# Patient Record
Sex: MALE | Race: BLACK OR AFRICAN AMERICAN | NOT HISPANIC OR LATINO | Employment: FULL TIME | URBAN - METROPOLITAN AREA
[De-identification: names, ages, dates, MRNs, and addresses within clinical notes are randomized per-mention and may not be internally consistent; named-entity substitution may affect disease eponyms.]

---

## 2021-07-27 ENCOUNTER — HOSPITAL ENCOUNTER (EMERGENCY)
Facility: CLINIC | Age: 46
Discharge: HOME | End: 2021-07-27
Attending: EMERGENCY MEDICINE | Admitting: EMERGENCY MEDICINE
Payer: COMMERCIAL

## 2021-07-27 VITALS
HEIGHT: 71 IN | TEMPERATURE: 98.3 F | RESPIRATION RATE: 18 BRPM | OXYGEN SATURATION: 100 % | SYSTOLIC BLOOD PRESSURE: 131 MMHG | HEART RATE: 86 BPM | DIASTOLIC BLOOD PRESSURE: 87 MMHG | BODY MASS INDEX: 32.62 KG/M2 | WEIGHT: 233 LBS

## 2021-07-27 DIAGNOSIS — K61.2 ABSCESS OF ANAL AND RECTAL REGIONS: ICD-10-CM

## 2021-07-27 DIAGNOSIS — R22.9 LOCALIZED SUPERFICIAL SWELLING, MASS, OR LUMP: ICD-10-CM

## 2021-07-27 PROCEDURE — 46050 I&D PERIANAL ABSCESS SUPFC: CPT

## 2021-07-27 PROCEDURE — 99283 EMERGENCY DEPT VISIT LOW MDM: CPT | Mod: 25

## 2021-07-27 RX ORDER — LIDOCAINE HYDROCHLORIDE 10 MG/ML
INJECTION, SOLUTION INFILTRATION; PERINEURAL
Status: DISCONTINUED
Start: 2021-07-27 | End: 2021-07-27 | Stop reason: HOSPADM

## 2021-07-27 ASSESSMENT — MIFFLIN-ST. JEOR: SCORE: 1954.01

## 2021-07-27 NOTE — ED TRIAGE NOTES
Pt presents for evaluation of a boil to the perineum (between the testicles and rectum). Pain present for last week. No drainage. Feels lump and is visible when looking in the mirror. Nothing taken for pain. No hx of similar.

## 2021-07-28 NOTE — ED PROVIDER NOTES
"  History   Chief Complaint:  Boil    The history is provided by the patient.      Tayo Ramos is a 46 year old male with history of asthma, hyperlipidemia, and thyroid nodules who presents with a boil near his scrotum. The patient explains that this boil has remained consistently present for the last 7 days. There is no fluid coming out of the site, but the boil has become harder. The boil and surrounding area is tender; when he washes the area and rubs across the boil, it burns. He had something similar about 2 years ago and it was cut out and bandaged appropriately.     Review of Systems   Skin:        (+) boil   All other systems reviewed and are negative.    Allergies:  Eggs  Influenza Vaccines    Medications:  Albuterol inhaler  Advair Diskus inhaler   Ultram    Past Medical History:    Asthma  Hyperlipidemia  Multiple thyroid nodules  Groin mass  Obstructive azoospermia    Social History:  Presents alone  History of smoking  Occasional alcohol use    Physical Exam     Patient Vitals for the past 24 hrs:   BP Temp Temp src Pulse Resp SpO2 Height Weight   07/27/21 1651 136/81 98.3  F (36.8  C) Temporal 99 18 99 % 1.803 m (5' 11\") 105.7 kg (233 lb)       Physical Exam  Vitals: reviewed by me  General: Pt seen on [facility], cooperative, and alert to conversation  Eyes: Tracking well, clear conjunctiva BL  ENT: MMM, midline trachea.   Lungs: No tachypnea, no accessory muscle use. No respiratory distress.   CV: Rate as above  Abd: Soft, non tender, no guarding, no rebound. Non distended  Does have a nickel sized lesion to his perineum, it is soft, mobile, tender.  Not red or inflamed.  No crepitance, no surrounding tenderness to palpation.  MSK: no joint effusion.  No evidence of trauma  Skin: No rash  Neuro: Clear speech and no facial droop.  Psych: Not RIS, no e/o AH/VH      Emergency Department Course     Procedures    Incision and Drainage     LOCATIONS:  Perineum     ANESTHESIA:  Local field " block using Lidocaine 1% without epinephrine, total of 1 mLs     PREPARATION:  Cleansed with Betadine     PROCEDURE:  Area was incised with # 11 Blade ([place]) with x-shaped incision.  Wound treatment included Deloculation, Purulent Drainage, Expression of Material and Sebum Removal.  Packing consisted of No Packing.  Appropriate dressing was applied to cover the area.    PATIENT STATUS: Patient tolerated the procedure well. There were no complications.    Emergency Department Course:    Reviewed:  I reviewed nursing notes, vitals, past medical history and care everywhere    Assessments:  2002 I obtained history and examined the patient as noted above.     Interventions:  2015 Lidocaine 1% injection    Disposition:  The patient was discharged to home.     Impression & Plan     Medical Decision Making:  Tayo Ramos is a very pleasant 46 year old male who presents to the emergency department for what appears to be a very small abscess on his perineum. It was superficial, and numbed and treated according to the note above. I was able to express a significant amount of sebum, and I do not think it was actually purulent or puss-like. He does still have some mild swelling in this area, although it is very very small and much less than when he came in. This leads me to believe that he may have a cyst. Sack still in place, and as such he does need to follow up with his regular doctor. Will plan for antibiotic cream, wound dressing, and discharge as above and with follow up as above.     Covid-19  Tayo Ramos was evaluated during a global COVID-19 pandemic, which necessitated consideration that the patient might be at risk for infection with the SARS-CoV-2 virus that causes COVID-19.   Applicable protocols for evaluation were followed during the patient's care. COVID-19 was considered as part of the patient's evaluation.     Diagnosis:    ICD-10-CM    1. Abscess of anal and rectal regions  K61.2    2. Localized  superficial swelling, mass, or lump  R22.9      Scribe Disclosure:  I, Gayle Barnard, am serving as a scribe at 8:02 PM on 7/27/2021 to document services personally performed by Buck Jay based on my observations and the provider's statements to me.            Buck Jay MD  07/27/21 3235

## 2021-07-28 NOTE — DISCHARGE INSTRUCTIONS
As we discussed, you do have a soft tissue mass in that area, and it did seem to be at least partially infected.  It was not as tender as a normal abscesses however, so I do think he may have some element of a small cyst or mass underneath.  You absolutely need to see your regular doctor the next 3 days for repeat wound check, and come back to the ER immediately with any redness, worsening pain, or any other concerns.  Please do use warm sitz bath's over the next 48 hours, and come back with any other concerns.

## 2024-01-25 ENCOUNTER — APPOINTMENT (OUTPATIENT)
Dept: GENERAL RADIOLOGY | Facility: CLINIC | Age: 49
End: 2024-01-25
Attending: EMERGENCY MEDICINE
Payer: COMMERCIAL

## 2024-01-25 ENCOUNTER — HOSPITAL ENCOUNTER (EMERGENCY)
Facility: CLINIC | Age: 49
Discharge: HOME | End: 2024-01-25
Attending: EMERGENCY MEDICINE | Admitting: EMERGENCY MEDICINE
Payer: COMMERCIAL

## 2024-01-25 VITALS
HEART RATE: 89 BPM | DIASTOLIC BLOOD PRESSURE: 81 MMHG | RESPIRATION RATE: 17 BRPM | OXYGEN SATURATION: 95 % | TEMPERATURE: 97.2 F | SYSTOLIC BLOOD PRESSURE: 123 MMHG

## 2024-01-25 DIAGNOSIS — J11.1 INFLUENZA-LIKE ILLNESS: ICD-10-CM

## 2024-01-25 LAB
ANION GAP SERPL CALCULATED.3IONS-SCNC: 12 MMOL/L (ref 7–15)
ATRIAL RATE - MUSE: 86 BPM
BASOPHILS # BLD AUTO: 0 10E3/UL (ref 0–0.2)
BASOPHILS NFR BLD AUTO: 0 %
BUN SERPL-MCNC: 12.8 MG/DL (ref 6–20)
CALCIUM SERPL-MCNC: 9.1 MG/DL (ref 8.6–10)
CHLORIDE SERPL-SCNC: 100 MMOL/L (ref 98–107)
CREAT SERPL-MCNC: 1.04 MG/DL (ref 0.67–1.17)
DEPRECATED HCO3 PLAS-SCNC: 24 MMOL/L (ref 22–29)
DIASTOLIC BLOOD PRESSURE - MUSE: NORMAL MMHG
EGFRCR SERPLBLD CKD-EPI 2021: 88 ML/MIN/1.73M2
EOSINOPHIL # BLD AUTO: 0.1 10E3/UL (ref 0–0.7)
EOSINOPHIL NFR BLD AUTO: 1 %
ERYTHROCYTE [DISTWIDTH] IN BLOOD BY AUTOMATED COUNT: 13.2 % (ref 10–15)
FLUAV RNA SPEC QL NAA+PROBE: NEGATIVE
FLUBV RNA RESP QL NAA+PROBE: NEGATIVE
GLUCOSE SERPL-MCNC: 170 MG/DL (ref 70–99)
GROUP A STREP BY PCR: NOT DETECTED
HCT VFR BLD AUTO: 42.9 % (ref 40–53)
HGB BLD-MCNC: 14.2 G/DL (ref 13.3–17.7)
IMM GRANULOCYTES # BLD: 0 10E3/UL
IMM GRANULOCYTES NFR BLD: 0 %
INTERPRETATION ECG - MUSE: NORMAL
LYMPHOCYTES # BLD AUTO: 1.9 10E3/UL (ref 0.8–5.3)
LYMPHOCYTES NFR BLD AUTO: 17 %
MCH RBC QN AUTO: 28.3 PG (ref 26.5–33)
MCHC RBC AUTO-ENTMCNC: 33.1 G/DL (ref 31.5–36.5)
MCV RBC AUTO: 86 FL (ref 78–100)
MONOCYTES # BLD AUTO: 0.7 10E3/UL (ref 0–1.3)
MONOCYTES NFR BLD AUTO: 6 %
NEUTROPHILS # BLD AUTO: 8.1 10E3/UL (ref 1.6–8.3)
NEUTROPHILS NFR BLD AUTO: 76 %
NRBC # BLD AUTO: 0 10E3/UL
NRBC BLD AUTO-RTO: 0 /100
P AXIS - MUSE: 76 DEGREES
PLATELET # BLD AUTO: 178 10E3/UL (ref 150–450)
POTASSIUM SERPL-SCNC: 3.9 MMOL/L (ref 3.4–5.3)
PR INTERVAL - MUSE: 194 MS
QRS DURATION - MUSE: 92 MS
QT - MUSE: 358 MS
QTC - MUSE: 428 MS
R AXIS - MUSE: 29 DEGREES
RBC # BLD AUTO: 5.01 10E6/UL (ref 4.4–5.9)
RSV RNA SPEC NAA+PROBE: NEGATIVE
SARS-COV-2 RNA RESP QL NAA+PROBE: NEGATIVE
SODIUM SERPL-SCNC: 136 MMOL/L (ref 135–145)
SYSTOLIC BLOOD PRESSURE - MUSE: NORMAL MMHG
T AXIS - MUSE: 38 DEGREES
TROPONIN T SERPL HS-MCNC: <6 NG/L
VENTRICULAR RATE- MUSE: 86 BPM
WBC # BLD AUTO: 10.9 10E3/UL (ref 4–11)

## 2024-01-25 PROCEDURE — 84484 ASSAY OF TROPONIN QUANT: CPT | Performed by: EMERGENCY MEDICINE

## 2024-01-25 PROCEDURE — 99285 EMERGENCY DEPT VISIT HI MDM: CPT | Mod: 25

## 2024-01-25 PROCEDURE — 250N000009 HC RX 250: Performed by: EMERGENCY MEDICINE

## 2024-01-25 PROCEDURE — 87651 STREP A DNA AMP PROBE: CPT | Performed by: EMERGENCY MEDICINE

## 2024-01-25 PROCEDURE — 94640 AIRWAY INHALATION TREATMENT: CPT

## 2024-01-25 PROCEDURE — 85025 COMPLETE CBC W/AUTO DIFF WBC: CPT | Performed by: EMERGENCY MEDICINE

## 2024-01-25 PROCEDURE — 80048 BASIC METABOLIC PNL TOTAL CA: CPT | Performed by: EMERGENCY MEDICINE

## 2024-01-25 PROCEDURE — 93005 ELECTROCARDIOGRAM TRACING: CPT

## 2024-01-25 PROCEDURE — 87637 SARSCOV2&INF A&B&RSV AMP PRB: CPT | Performed by: EMERGENCY MEDICINE

## 2024-01-25 PROCEDURE — 96375 TX/PRO/DX INJ NEW DRUG ADDON: CPT

## 2024-01-25 PROCEDURE — 71046 X-RAY EXAM CHEST 2 VIEWS: CPT

## 2024-01-25 PROCEDURE — 250N000011 HC RX IP 250 OP 636: Performed by: EMERGENCY MEDICINE

## 2024-01-25 PROCEDURE — 36415 COLL VENOUS BLD VENIPUNCTURE: CPT | Performed by: EMERGENCY MEDICINE

## 2024-01-25 PROCEDURE — 258N000003 HC RX IP 258 OP 636: Performed by: EMERGENCY MEDICINE

## 2024-01-25 PROCEDURE — 96374 THER/PROPH/DIAG INJ IV PUSH: CPT

## 2024-01-25 RX ORDER — IPRATROPIUM BROMIDE AND ALBUTEROL SULFATE 2.5; .5 MG/3ML; MG/3ML
3 SOLUTION RESPIRATORY (INHALATION)
Status: DISCONTINUED | OUTPATIENT
Start: 2024-01-25 | End: 2024-01-25 | Stop reason: HOSPADM

## 2024-01-25 RX ORDER — DEXAMETHASONE SODIUM PHOSPHATE 10 MG/ML
10 INJECTION, SOLUTION INTRAMUSCULAR; INTRAVENOUS ONCE
Status: COMPLETED | OUTPATIENT
Start: 2024-01-25 | End: 2024-01-25

## 2024-01-25 RX ORDER — KETOROLAC TROMETHAMINE 15 MG/ML
15 INJECTION, SOLUTION INTRAMUSCULAR; INTRAVENOUS ONCE
Status: COMPLETED | OUTPATIENT
Start: 2024-01-25 | End: 2024-01-25

## 2024-01-25 RX ORDER — PREDNISONE 20 MG/1
TABLET ORAL
Qty: 10 TABLET | Refills: 0 | Status: SHIPPED | OUTPATIENT
Start: 2024-01-25

## 2024-01-25 RX ORDER — ONDANSETRON 4 MG/1
4 TABLET, ORALLY DISINTEGRATING ORAL EVERY 8 HOURS PRN
Qty: 10 TABLET | Refills: 0 | Status: SHIPPED | OUTPATIENT
Start: 2024-01-25 | End: 2024-01-28

## 2024-01-25 RX ORDER — OSELTAMIVIR PHOSPHATE 75 MG/1
75 CAPSULE ORAL 2 TIMES DAILY
Qty: 10 CAPSULE | Refills: 0 | Status: SHIPPED | OUTPATIENT
Start: 2024-01-25 | End: 2024-01-30

## 2024-01-25 RX ADMIN — IPRATROPIUM BROMIDE AND ALBUTEROL SULFATE 3 ML: .5; 3 SOLUTION RESPIRATORY (INHALATION) at 03:47

## 2024-01-25 RX ADMIN — DEXAMETHASONE SODIUM PHOSPHATE 10 MG: 10 INJECTION INTRAMUSCULAR; INTRAVENOUS at 03:45

## 2024-01-25 RX ADMIN — SODIUM CHLORIDE 1000 ML: 9 INJECTION, SOLUTION INTRAVENOUS at 03:43

## 2024-01-25 RX ADMIN — KETOROLAC TROMETHAMINE 15 MG: 15 INJECTION, SOLUTION INTRAMUSCULAR; INTRAVENOUS at 03:44

## 2024-01-25 ASSESSMENT — ACTIVITIES OF DAILY LIVING (ADL)
ADLS_ACUITY_SCORE: 33
ADLS_ACUITY_SCORE: 35

## 2024-01-25 NOTE — ED NOTES
Reports feeling better but still having upper left chest pain. Declined 2nd neb at this time.    Dr. Bradley updated

## 2024-01-25 NOTE — ED TRIAGE NOTES
Pt arrives from home via EMS for dizziness, weakness, and heart felt like it was racing when he woke up to use the bathroom around 2am. Earlier today pt had productive cough, took Robitussin DM at 1900 and Mucinex DM at 2100. Pt's symptoms have somewhat improved but still feeling off. Pt slow to answer and whispers. VSS, A&Ox4

## 2024-01-25 NOTE — ED PROVIDER NOTES
History     Chief Complaint:  Dizziness       HPI   Tayo Ramos is a 48 year old male with history of asthma and type 2 diabetes who presents to the ED via EMS for evaluation of dizziness. He reports being sick since 1700 yesterday with symptoms of sore throat, lightheadedness, headache and weakness. He states having chest pain in the middle of the night when he got woken up with it. He was having hot flashes, was lightheaded and sweaty. He states being able to go to his bed, not pass out but it lasted for several minutes. He reports pain when taking a deep breath. Patient is currently dizzy, short of breath and generally weak. No one has been sick around him. Denies fever, chills, body aches, abdominal pain, nausea or vomiting.     Independent Historian:    None - Patient only     Medications:    Tessalon  Azithromycin  Lipitor  Albuterol inhaler     Past Medical History:    Asthma  Type 2 diabetes   Hyperlipidemia   Thyroid nodule  Obstructive Azoospermia  Obesity  Post Vasoepididymostomy  Ventral hernia  Umbilical herniorrhaphy   Groin mass     Past Surgical History:    Open umbilical hernia repair  Open ventral hernia      Physical Exam   Patient Vitals for the past 24 hrs:   BP Temp Temp src Pulse Resp SpO2   01/25/24 0415 112/89 -- -- 96 26 100 %   01/25/24 0400 118/80 -- -- 83 20 100 %   01/25/24 0345 121/84 -- -- 87 22 99 %   01/25/24 0330 124/81 -- -- 89 20 99 %   01/25/24 0317 125/82 -- -- 89 23 100 %   01/25/24 0309 -- -- -- 86 22 99 %   01/25/24 0308 -- -- -- -- -- 99 %   01/25/24 0307 120/82 -- -- 87 -- --   01/25/24 0256 -- 97.2  F (36.2  C) Temporal -- -- --   01/25/24 0254 121/78 -- -- 93 18 98 %      Physical Exam  General: Patient is awake, alert  Head: The scalp, face, and head appear normal  Eyes: The pupils are equal, round, and reactive to light. Conjunctivae and sclerae are normal  ENT: External acoustic canals are normal. The oropharynx is normal without erythema. Uvula is in the  midline  Neck: Normal range of motion.   CV: Regular rate and rhythm.   Resp: Lungs are clear without wheezes or rales. No respiratory distress.   GI: Abdomen is soft, no rigidity, guarding, or rebound. No distension. No tenderness to palpation in any quadrant.    MS: Normal tone. Joints grossly normal without effusions. No asymmetric leg swelling, calf or thigh tenderness.    Skin: No rash or lesions noted. Normal capillary refill noted  Neuro: Speech is normal and fluent. Face is symmetric. Moving all extremities.   Psych:  Normal affect.  Appropriate interactions.    Emergency Department Course   ECG  ECG taken at 0307, ECG read at 0310  Normal sinus rhythm    Rate 86 bpm. OH interval 194 ms. QRS duration 92 ms. QT/QTc 358/428 ms. P-R-T axes 76 29 38.    Imaging:  XR Chest 2 Views   Final Result   IMPRESSION: Negative chest.        Report per radiology    Laboratory:  Labs Ordered and Resulted from Time of ED Arrival to Time of ED Departure   BASIC METABOLIC PANEL - Abnormal       Result Value    Sodium 136      Potassium 3.9      Chloride 100      Carbon Dioxide (CO2) 24      Anion Gap 12      Urea Nitrogen 12.8      Creatinine 1.04      GFR Estimate 88      Calcium 9.1      Glucose 170 (*)    INFLUENZA A/B, RSV, & SARS-COV2 PCR - Normal    Influenza A PCR Negative      Influenza B PCR Negative      RSV PCR Negative      SARS CoV2 PCR Negative     TROPONIN T, HIGH SENSITIVITY - Normal    Troponin T, High Sensitivity <6     GROUP A STREPTOCOCCUS PCR THROAT SWAB - Normal    Group A strep by PCR Not Detected     CBC WITH PLATELETS AND DIFFERENTIAL    WBC Count 10.9      RBC Count 5.01      Hemoglobin 14.2      Hematocrit 42.9      MCV 86      MCH 28.3      MCHC 33.1      RDW 13.2      Platelet Count 178      % Neutrophils 76      % Lymphocytes 17      % Monocytes 6      % Eosinophils 1      % Basophils 0      % Immature Granulocytes 0      NRBCs per 100 WBC 0      Absolute Neutrophils 8.1      Absolute Lymphocytes  1.9      Absolute Monocytes 0.7      Absolute Eosinophils 0.1      Absolute Basophils 0.0      Absolute Immature Granulocytes 0.0      Absolute NRBCs 0.0        Procedures   None    Emergency Department Course & Assessments:       Interventions:  Medications   ipratropium - albuterol 0.5 mg/2.5 mg/3 mL (DUONEB) neb solution 3 mL (3 mLs Nebulization $Given 1/25/24 0347)   dexAMETHasone PF (DECADRON) injection 10 mg (10 mg Intravenous $Given 1/25/24 0345)   sodium chloride 0.9% BOLUS 1,000 mL (0 mLs Intravenous Stopped 1/25/24 0412)   ketorolac (TORADOL) injection 15 mg (15 mg Intravenous $Given 1/25/24 0344)      Independent Interpretation (X-rays, CTs, rhythm strip):  CXR is negative for PNA     Assessments/Consultations/Discussion of Management or Tests:  ED Course as of 01/25/24 0457   Thu Jan 25, 2024   0315 I obtained history and examined the patient as noted above.    0452 I rechecked the patient and explained findings. Patient is feeling a bit better.   0456 I prepared the patient to be discharged home.      Social Determinants of Health affecting care:  None     Disposition:  The patient was discharged to home.     Impression & Plan      Medical Decision Making:  Tayo Ramos is a 48 year old male who presents for evaluation of near syncope, diaphoresis, chest pain, cough, and generalized weakness.  This is consistent with an influenza like illness.  COVID, influenza and RSV negative.  However still high suspicion for influenza.  Chest x-ray was obtained and was negative for signs of pneumonia.  Remainder of blood work was reassuring.  Cardiac etiology was also considered.  EKG does not show any acute signs of ischemia or dysrhythmia.  Troponin is undetectably low.  Given low pretest probability and negative workup I feel like this is much less likely to represent acute coronary syndrome.  Discussed treatment with Tamiflu and patient would like to move forward with treatment given influenza-like illness.   They are at risk for pneumonia but no signs of this are detected on today's visit.  Close followup of primary care physician is indicated and return to the ED for high fevers > 103 for more than 48 hours more, increasing productive cough, shortness of breath, or confusion.  There is no signs of serious bacterial infection such as bacteremia, meningitis, UTI/pyelonephritis, strep pharyngitis, etc.       Diagnosis:    ICD-10-CM    1. Influenza-like illness  J11.1            Discharge Medications:  New Prescriptions    ONDANSETRON (ZOFRAN ODT) 4 MG ODT TAB    Take 1 tablet (4 mg) by mouth every 8 hours as needed for nausea    OSELTAMIVIR (TAMIFLU) 75 MG CAPSULE    Take 1 capsule (75 mg) by mouth 2 times daily for 5 days    PREDNISONE (DELTASONE) 20 MG TABLET    Take two tablets (= 40mg) each day for 5 (five) days      Scribe Disclosure:  Olga CASEY, am serving as a scribe at 3:14 AM on 1/25/2024 to document services personally performed by Ashwin Bradley based on my observations and the provider's statements to me.  1/25/2024   Ashwin Bradley Christopher Joseph, MD  01/25/24 0604

## 2025-01-22 ENCOUNTER — HOSPITAL ENCOUNTER (OUTPATIENT)
Facility: CLINIC | Age: 50
Setting detail: OBSERVATION
End: 2025-01-22
Attending: EMERGENCY MEDICINE | Admitting: HOSPITALIST
Payer: COMMERCIAL

## 2025-01-22 DIAGNOSIS — E11.9 INSULIN DEPENDENT TYPE 2 DIABETES MELLITUS (H): Primary | ICD-10-CM

## 2025-01-22 DIAGNOSIS — E11.65 TYPE 2 DIABETES MELLITUS WITH HYPERGLYCEMIA, UNSPECIFIED WHETHER LONG TERM INSULIN USE (H): ICD-10-CM

## 2025-01-22 DIAGNOSIS — Z79.4 INSULIN-REQUIRING OR DEPENDENT TYPE II DIABETES MELLITUS (H): ICD-10-CM

## 2025-01-22 DIAGNOSIS — E11.9 INSULIN-REQUIRING OR DEPENDENT TYPE II DIABETES MELLITUS (H): ICD-10-CM

## 2025-01-22 DIAGNOSIS — E78.5 HYPERLIPIDEMIA LDL GOAL <100: ICD-10-CM

## 2025-01-22 DIAGNOSIS — R73.9 STEROID-INDUCED HYPERGLYCEMIA: ICD-10-CM

## 2025-01-22 DIAGNOSIS — Z79.4 INSULIN DEPENDENT TYPE 2 DIABETES MELLITUS (H): Primary | ICD-10-CM

## 2025-01-22 DIAGNOSIS — T38.0X5A STEROID-INDUCED HYPERGLYCEMIA: ICD-10-CM

## 2025-01-22 DIAGNOSIS — E88.89 KETOSIS (H): ICD-10-CM

## 2025-01-22 LAB
ALBUMIN SERPL BCG-MCNC: 4.3 G/DL (ref 3.5–5.2)
ALBUMIN UR-MCNC: NEGATIVE MG/DL
ALP SERPL-CCNC: 167 U/L (ref 40–150)
ALT SERPL W P-5'-P-CCNC: 30 U/L (ref 0–70)
ANION GAP SERPL CALCULATED.3IONS-SCNC: 12 MMOL/L (ref 7–15)
ANION GAP SERPL CALCULATED.3IONS-SCNC: 19 MMOL/L (ref 7–15)
ANION GAP SERPL CALCULATED.3IONS-SCNC: 19 MMOL/L (ref 7–15)
APPEARANCE UR: CLEAR
AST SERPL W P-5'-P-CCNC: 23 U/L (ref 0–45)
ATRIAL RATE - MUSE: 90 BPM
B-OH-BUTYR SERPL-SCNC: 0.84 MMOL/L
B-OH-BUTYR SERPL-SCNC: 2.47 MMOL/L
BASOPHILS # BLD AUTO: 0 10E3/UL (ref 0–0.2)
BASOPHILS NFR BLD AUTO: 1 %
BILIRUB SERPL-MCNC: 0.7 MG/DL
BILIRUB UR QL STRIP: NEGATIVE
BUN SERPL-MCNC: 16.9 MG/DL (ref 6–20)
BUN SERPL-MCNC: 20 MG/DL (ref 6–20)
BUN SERPL-MCNC: 20 MG/DL (ref 6–20)
CALCIUM SERPL-MCNC: 10 MG/DL (ref 8.8–10.4)
CALCIUM SERPL-MCNC: 10 MG/DL (ref 8.8–10.4)
CALCIUM SERPL-MCNC: 9.2 MG/DL (ref 8.8–10.4)
CHLORIDE SERPL-SCNC: 106 MMOL/L (ref 98–107)
CHLORIDE SERPL-SCNC: 90 MMOL/L (ref 98–107)
CHLORIDE SERPL-SCNC: 90 MMOL/L (ref 98–107)
COLOR UR AUTO: ABNORMAL
CREAT SERPL-MCNC: 0.93 MG/DL (ref 0.67–1.17)
CREAT SERPL-MCNC: 1.14 MG/DL (ref 0.67–1.17)
CREAT SERPL-MCNC: 1.14 MG/DL (ref 0.67–1.17)
DIASTOLIC BLOOD PRESSURE - MUSE: NORMAL MMHG
EGFRCR SERPLBLD CKD-EPI 2021: 78 ML/MIN/1.73M2
EGFRCR SERPLBLD CKD-EPI 2021: 78 ML/MIN/1.73M2
EGFRCR SERPLBLD CKD-EPI 2021: >90 ML/MIN/1.73M2
EOSINOPHIL # BLD AUTO: 0.2 10E3/UL (ref 0–0.7)
EOSINOPHIL NFR BLD AUTO: 2 %
ERYTHROCYTE [DISTWIDTH] IN BLOOD BY AUTOMATED COUNT: 12.2 % (ref 10–15)
EST. AVERAGE GLUCOSE BLD GHB EST-MCNC: 324 MG/DL
GLUCOSE BLDC GLUCOMTR-MCNC: 112 MG/DL (ref 70–99)
GLUCOSE BLDC GLUCOMTR-MCNC: 118 MG/DL (ref 70–99)
GLUCOSE BLDC GLUCOMTR-MCNC: 132 MG/DL (ref 70–99)
GLUCOSE BLDC GLUCOMTR-MCNC: 157 MG/DL (ref 70–99)
GLUCOSE BLDC GLUCOMTR-MCNC: 196 MG/DL (ref 70–99)
GLUCOSE BLDC GLUCOMTR-MCNC: 231 MG/DL (ref 70–99)
GLUCOSE BLDC GLUCOMTR-MCNC: 251 MG/DL (ref 70–99)
GLUCOSE BLDC GLUCOMTR-MCNC: 347 MG/DL (ref 70–99)
GLUCOSE BLDC GLUCOMTR-MCNC: 440 MG/DL (ref 70–99)
GLUCOSE BLDC GLUCOMTR-MCNC: 535 MG/DL (ref 70–99)
GLUCOSE BLDC GLUCOMTR-MCNC: >600 MG/DL (ref 70–99)
GLUCOSE SERPL-MCNC: 260 MG/DL (ref 70–99)
GLUCOSE SERPL-MCNC: 779 MG/DL (ref 70–99)
GLUCOSE SERPL-MCNC: 779 MG/DL (ref 70–99)
GLUCOSE UR STRIP-MCNC: >=1000 MG/DL
HBA1C MFR BLD: 12.9 %
HCO3 BLDV-SCNC: 26 MMOL/L (ref 21–28)
HCO3 SERPL-SCNC: 23 MMOL/L (ref 22–29)
HCT VFR BLD AUTO: 43.2 % (ref 40–53)
HGB BLD-MCNC: 14.6 G/DL (ref 13.3–17.7)
HGB UR QL STRIP: NEGATIVE
HOLD SPECIMEN: NORMAL
HOLD SPECIMEN: NORMAL
IMM GRANULOCYTES # BLD: 0 10E3/UL
IMM GRANULOCYTES NFR BLD: 0 %
INTERPRETATION ECG - MUSE: NORMAL
KETONES UR STRIP-MCNC: 40 MG/DL
LACTATE BLD-SCNC: 1.3 MMOL/L
LEUKOCYTE ESTERASE UR QL STRIP: NEGATIVE
LYMPHOCYTES # BLD AUTO: 2.2 10E3/UL (ref 0.8–5.3)
LYMPHOCYTES NFR BLD AUTO: 30 %
MAGNESIUM SERPL-MCNC: 2.6 MG/DL (ref 1.7–2.3)
MCH RBC QN AUTO: 27.6 PG (ref 26.5–33)
MCHC RBC AUTO-ENTMCNC: 33.8 G/DL (ref 31.5–36.5)
MCV RBC AUTO: 82 FL (ref 78–100)
MONOCYTES # BLD AUTO: 0.5 10E3/UL (ref 0–1.3)
MONOCYTES NFR BLD AUTO: 7 %
MUCOUS THREADS #/AREA URNS LPF: PRESENT /LPF
NEUTROPHILS # BLD AUTO: 4.4 10E3/UL (ref 1.6–8.3)
NEUTROPHILS NFR BLD AUTO: 60 %
NITRATE UR QL: NEGATIVE
NRBC # BLD AUTO: 0 10E3/UL
NRBC BLD AUTO-RTO: 0 /100
P AXIS - MUSE: 72 DEGREES
PCO2 BLDV: 44 MM HG (ref 40–50)
PH BLDV: 7.39 [PH] (ref 7.32–7.43)
PH UR STRIP: 5 [PH] (ref 5–7)
PHOSPHATE SERPL-MCNC: 4.7 MG/DL (ref 2.5–4.5)
PLATELET # BLD AUTO: 222 10E3/UL (ref 150–450)
PO2 BLDV: 62 MM HG (ref 25–47)
POTASSIUM SERPL-SCNC: 4.8 MMOL/L (ref 3.4–5.3)
POTASSIUM SERPL-SCNC: 4.8 MMOL/L (ref 3.4–5.3)
POTASSIUM SERPL-SCNC: 5.2 MMOL/L (ref 3.4–5.3)
POTASSIUM SERPL-SCNC: 6.5 MMOL/L (ref 3.4–5.3)
PR INTERVAL - MUSE: 158 MS
PROT SERPL-MCNC: 7.7 G/DL (ref 6.4–8.3)
QRS DURATION - MUSE: 84 MS
QT - MUSE: 354 MS
QTC - MUSE: 433 MS
R AXIS - MUSE: 37 DEGREES
RBC # BLD AUTO: 5.29 10E6/UL (ref 4.4–5.9)
RBC URINE: 1 /HPF
SAO2 % BLDV: 91 % (ref 70–75)
SODIUM SERPL-SCNC: 132 MMOL/L (ref 135–145)
SODIUM SERPL-SCNC: 132 MMOL/L (ref 135–145)
SODIUM SERPL-SCNC: 141 MMOL/L (ref 135–145)
SP GR UR STRIP: 1.03 (ref 1–1.03)
SYSTOLIC BLOOD PRESSURE - MUSE: NORMAL MMHG
T AXIS - MUSE: 47 DEGREES
TROPONIN T SERPL HS-MCNC: <6 NG/L
UROBILINOGEN UR STRIP-MCNC: NORMAL MG/DL
VENTRICULAR RATE- MUSE: 90 BPM
WBC # BLD AUTO: 7.3 10E3/UL (ref 4–11)
WBC URINE: 0 /HPF

## 2025-01-22 PROCEDURE — 82010 KETONE BODYS QUAN: CPT | Performed by: EMERGENCY MEDICINE

## 2025-01-22 PROCEDURE — 85025 COMPLETE CBC W/AUTO DIFF WBC: CPT | Performed by: EMERGENCY MEDICINE

## 2025-01-22 PROCEDURE — 82533 TOTAL CORTISOL: CPT | Performed by: HOSPITALIST

## 2025-01-22 PROCEDURE — 258N000003 HC RX IP 258 OP 636: Performed by: EMERGENCY MEDICINE

## 2025-01-22 PROCEDURE — 99223 1ST HOSP IP/OBS HIGH 75: CPT | Performed by: HOSPITALIST

## 2025-01-22 PROCEDURE — 83735 ASSAY OF MAGNESIUM: CPT | Performed by: EMERGENCY MEDICINE

## 2025-01-22 PROCEDURE — 82310 ASSAY OF CALCIUM: CPT | Performed by: EMERGENCY MEDICINE

## 2025-01-22 PROCEDURE — 36415 COLL VENOUS BLD VENIPUNCTURE: CPT | Performed by: EMERGENCY MEDICINE

## 2025-01-22 PROCEDURE — 96361 HYDRATE IV INFUSION ADD-ON: CPT

## 2025-01-22 PROCEDURE — 96365 THER/PROPH/DIAG IV INF INIT: CPT

## 2025-01-22 PROCEDURE — 250N000012 HC RX MED GY IP 250 OP 636 PS 637: Performed by: HOSPITALIST

## 2025-01-22 PROCEDURE — 82962 GLUCOSE BLOOD TEST: CPT

## 2025-01-22 PROCEDURE — 99291 CRITICAL CARE FIRST HOUR: CPT | Mod: 25

## 2025-01-22 PROCEDURE — 84132 ASSAY OF SERUM POTASSIUM: CPT | Performed by: EMERGENCY MEDICINE

## 2025-01-22 PROCEDURE — 81001 URINALYSIS AUTO W/SCOPE: CPT | Performed by: EMERGENCY MEDICINE

## 2025-01-22 PROCEDURE — 82803 BLOOD GASES ANY COMBINATION: CPT

## 2025-01-22 PROCEDURE — 93005 ELECTROCARDIOGRAM TRACING: CPT

## 2025-01-22 PROCEDURE — 82040 ASSAY OF SERUM ALBUMIN: CPT | Performed by: EMERGENCY MEDICINE

## 2025-01-22 PROCEDURE — 83036 HEMOGLOBIN GLYCOSYLATED A1C: CPT | Performed by: EMERGENCY MEDICINE

## 2025-01-22 PROCEDURE — 84484 ASSAY OF TROPONIN QUANT: CPT | Performed by: EMERGENCY MEDICINE

## 2025-01-22 PROCEDURE — 120N000013 HC R&B IMCU

## 2025-01-22 PROCEDURE — 80048 BASIC METABOLIC PNL TOTAL CA: CPT | Performed by: EMERGENCY MEDICINE

## 2025-01-22 PROCEDURE — 84100 ASSAY OF PHOSPHORUS: CPT | Performed by: EMERGENCY MEDICINE

## 2025-01-22 PROCEDURE — 250N000009 HC RX 250: Performed by: EMERGENCY MEDICINE

## 2025-01-22 PROCEDURE — 99292 CRITICAL CARE ADDL 30 MIN: CPT

## 2025-01-22 RX ORDER — DEXTROSE MONOHYDRATE 25 G/50ML
25-50 INJECTION, SOLUTION INTRAVENOUS
Status: DISCONTINUED | OUTPATIENT
Start: 2025-01-22 | End: 2025-01-24 | Stop reason: HOSPADM

## 2025-01-22 RX ORDER — DEXTROSE MONOHYDRATE AND SODIUM CHLORIDE 5; .45 G/100ML; G/100ML
1000 INJECTION, SOLUTION INTRAVENOUS CONTINUOUS
Status: DISCONTINUED | OUTPATIENT
Start: 2025-01-22 | End: 2025-01-24 | Stop reason: HOSPADM

## 2025-01-22 RX ORDER — LIDOCAINE 40 MG/G
CREAM TOPICAL
Status: DISCONTINUED | OUTPATIENT
Start: 2025-01-22 | End: 2025-01-24 | Stop reason: HOSPADM

## 2025-01-22 RX ORDER — ALBUTEROL SULFATE 90 UG/1
2 INHALANT RESPIRATORY (INHALATION) EVERY 4 HOURS PRN
COMMUNITY

## 2025-01-22 RX ORDER — DEXTROSE MONOHYDRATE 25 G/50ML
25-50 INJECTION, SOLUTION INTRAVENOUS
Status: DISCONTINUED | OUTPATIENT
Start: 2025-01-22 | End: 2025-01-22

## 2025-01-22 RX ORDER — FLUTICASONE FUROATE AND VILANTEROL 100; 25 UG/1; UG/1
1 POWDER RESPIRATORY (INHALATION) DAILY
Status: DISCONTINUED | OUTPATIENT
Start: 2025-01-23 | End: 2025-01-24 | Stop reason: HOSPADM

## 2025-01-22 RX ORDER — IPRATROPIUM BROMIDE AND ALBUTEROL SULFATE 2.5; .5 MG/3ML; MG/3ML
1 SOLUTION RESPIRATORY (INHALATION) EVERY 6 HOURS PRN
COMMUNITY

## 2025-01-22 RX ORDER — SODIUM CHLORIDE 9 MG/ML
INJECTION, SOLUTION INTRAVENOUS ONCE
Status: COMPLETED | OUTPATIENT
Start: 2025-01-22 | End: 2025-01-22

## 2025-01-22 RX ORDER — SODIUM CHLORIDE 9 MG/ML
INJECTION, SOLUTION INTRAVENOUS CONTINUOUS
Status: DISCONTINUED | OUTPATIENT
Start: 2025-01-22 | End: 2025-01-22

## 2025-01-22 RX ORDER — NICOTINE POLACRILEX 4 MG
15-30 LOZENGE BUCCAL
Status: DISCONTINUED | OUTPATIENT
Start: 2025-01-22 | End: 2025-01-24 | Stop reason: HOSPADM

## 2025-01-22 RX ORDER — NICOTINE POLACRILEX 4 MG
15-30 LOZENGE BUCCAL
Status: DISCONTINUED | OUTPATIENT
Start: 2025-01-22 | End: 2025-01-22

## 2025-01-22 RX ADMIN — DEXTROSE AND SODIUM CHLORIDE 1000 ML: 5; 450 INJECTION, SOLUTION INTRAVENOUS at 17:52

## 2025-01-22 RX ADMIN — INSULIN GLARGINE 22 UNITS: 100 INJECTION, SOLUTION SUBCUTANEOUS at 22:42

## 2025-01-22 RX ADMIN — INSULIN HUMAN 5.5 UNITS/HR: 1 INJECTION, SOLUTION INTRAVENOUS at 14:27

## 2025-01-22 RX ADMIN — SODIUM CHLORIDE 2000 ML: 9 INJECTION, SOLUTION INTRAVENOUS at 13:27

## 2025-01-22 RX ADMIN — SODIUM CHLORIDE: 9 INJECTION, SOLUTION INTRAVENOUS at 16:46

## 2025-01-22 ASSESSMENT — ACTIVITIES OF DAILY LIVING (ADL)
ADLS_ACUITY_SCORE: 41
ADLS_ACUITY_SCORE: 15
ADLS_ACUITY_SCORE: 41

## 2025-01-22 ASSESSMENT — COLUMBIA-SUICIDE SEVERITY RATING SCALE - C-SSRS
2. HAVE YOU ACTUALLY HAD ANY THOUGHTS OF KILLING YOURSELF IN THE PAST MONTH?: NO
1. IN THE PAST MONTH, HAVE YOU WISHED YOU WERE DEAD OR WISHED YOU COULD GO TO SLEEP AND NOT WAKE UP?: NO

## 2025-01-22 NOTE — ED TRIAGE NOTES
Patient coming in from  with blood sugar to high to read on their devices. Originally went to  for blurred vision, fatigue, increased thirst, body aches. States he's not aware of having diabetes. BG here reading as high. ABCs intact. VSS.

## 2025-01-22 NOTE — ED NOTES
Patient requested ice cream. RN at bedside to educate patient on NPO status and implications with active insulin gtt.

## 2025-01-22 NOTE — ED NOTES
Bed: ED17  Expected date:   Expected time:   Means of arrival:   Comments:  Jose Roberto - damaris   , yes

## 2025-01-22 NOTE — ED PROVIDER NOTES
Emergency Department Note      History of Present Illness     Chief Complaint   Eye Problem and Hyperglycemia      HPI   Tayo Ramos is a 49 year old male with history of asthma and type 2 diabetes mellitus who presents to the ED from Urgent Care for evaluation of eye problem and hyperglycemia. The patient reports 4 days of dry mouth, difficulty swallowing and eating, dizziness, trouble ambulating, and fatigue. Tayo has also had blurry vision for the past 4 days as well. He has been drinking a lot of water. He adds that he was on a 10-day course of prednisone and Augmentin starting on 12/6/24. Denies recent dietary changes or recent alcohol use. Patient has an established PCP.       Independent Historian   Wife via [facility] on the patient's phone    Review of External Notes   I reviewed Shabnam Nichols's Urgent Care Note from earlier today discussing dizziness, 4 days of blurred vision, and throat problem.     I reviewed Obinna Hoang's 12/6/24 Urgent Care Note where patient was prescribed 10 day course of prednisone and Augmentin.   CMP in April 2024 showed a blood sugar of 123.     Past Medical History     Medical History and Problem List   Asthma   Type 2 diabetes mellitus   COVID-19  Obesity   Hyperlipidemia  Thyroid nodules  Hernia    Medications   Albuterol  Advair diskus   Lipitor     Surgical History   Open umbilical hernia repair combined with ventral hernia Phasix mesh small patch    Physical Exam     Patient Vitals for the past 24 hrs:   BP Temp Temp src Pulse Resp SpO2 Weight   01/22/25 1445 132/74 -- -- 88 14 100 % --   01/22/25 1430 127/83 -- -- 77 13 -- --   01/22/25 1415 -- -- -- 80 12 -- --   01/22/25 1400 -- -- -- 87 11 -- --   01/22/25 1345 117/71 -- -- 93 20 -- --   01/22/25 1230 124/77 -- -- 98 18 100 % --   01/22/25 1115 128/80 98.6  F (37  C) Temporal 105 18 96 % 98.6 kg (217 lb 6 oz)     Physical Exam  General: The patient is alert, in no respiratory distress.    HENT: Mucous membranes  dry.    Cardiovascular: Regular rate and rhythm. Good pulses in all four extremities. Normal capillary refill and skin turgor.     Respiratory: Lungs are clear. No nasal flaring. No retractions. No wheezing, no crackles.    Gastrointestinal: Abdomen soft. No guarding, no rebound. No palpable hernias.     Musculoskeletal: No gross deformity.     Skin: No rashes or petechiae.     Neurologic: The patient is alert and oriented x3. GCS 15. No testable cranial nerve deficit. Follows commands with clear and appropriate speech. Gives appropriate answers. Good strength in all extremities. No gross neurologic deficit. Gross sensation intact. Pupils are round and reactive. No meningismus.     Lymphatic: No cervical adenopathy. No lower extremity swelling.    Psychiatric: The patient is non-tearful.     Diagnostics     Lab Results   Labs Ordered and Resulted from Time of ED Arrival to Time of ED Departure   GLUCOSE BY METER - Abnormal       Result Value    GLUCOSE BY METER POCT >600 (*)    COMPREHENSIVE METABOLIC PANEL - Abnormal    Sodium 132 (*)     Potassium 4.8      Carbon Dioxide (CO2) 23      Anion Gap 19 (*)     Urea Nitrogen 20.0      Creatinine 1.14      GFR Estimate 78      Calcium 10.0      Chloride 90 (*)     Glucose 779 (*)     Alkaline Phosphatase 167 (*)     AST 23      ALT 30      Protein Total 7.7      Albumin 4.3      Bilirubin Total 0.7     ROUTINE UA WITH MICROSCOPIC REFLEX TO CULTURE - Abnormal    Color Urine Straw      Appearance Urine Clear      Glucose Urine >=1000 (*)     Bilirubin Urine Negative      Ketones Urine 40 (*)     Specific Gravity Urine 1.034      Blood Urine Negative      pH Urine 5.0      Protein Albumin Urine Negative      Urobilinogen Urine Normal      Nitrite Urine Negative      Leukocyte Esterase Urine Negative      Mucus Urine Present (*)     RBC Urine 1      WBC Urine 0     KETONE BETA-HYDROXYBUTYRATE QUANTITATIVE, RAPID - Abnormal    Ketone (Beta-Hydroxybutyrate) Quantitative 2.47  (*)    ISTAT GASES LACTATE VENOUS POCT - Abnormal    Lactic Acid POCT 1.3      Bicarbonate Venous POCT 26      O2 Sat, Venous POCT 91 (*)     pCO2 Venous POCT 44      pH Venous POCT 7.39      pO2 Venous POCT 62 (*)    HEMOGLOBIN A1C - Abnormal    Estimated Average Glucose 324 (*)     Hemoglobin A1C 12.9 (*)    PHOSPHORUS - Abnormal    Phosphorus 4.7 (*)    MAGNESIUM - Abnormal    Magnesium 2.6 (*)    GLUCOSE BY METER - Abnormal    GLUCOSE BY METER POCT 535 (*)    TROPONIN T, HIGH SENSITIVITY - Normal    Troponin T, High Sensitivity <6     CBC WITH PLATELETS AND DIFFERENTIAL    WBC Count 7.3      RBC Count 5.29      Hemoglobin 14.6      Hematocrit 43.2      MCV 82      MCH 27.6      MCHC 33.8      RDW 12.2      Platelet Count 222      % Neutrophils 60      % Lymphocytes 30      % Monocytes 7      % Eosinophils 2      % Basophils 1      % Immature Granulocytes 0      NRBCs per 100 WBC 0      Absolute Neutrophils 4.4      Absolute Lymphocytes 2.2      Absolute Monocytes 0.5      Absolute Eosinophils 0.2      Absolute Basophils 0.0      Absolute Immature Granulocytes 0.0      Absolute NRBCs 0.0     GLUCOSE MONITOR NURSING POCT   GLUCOSE MONITOR NURSING POCT   GLUCOSE MONITOR NURSING POCT       Imaging   No orders to display       EKG   ECG results from 01/22/25   EKG 12-lead, tracing only     Value    Systolic Blood Pressure     Diastolic Blood Pressure     Ventricular Rate 90    Atrial Rate 90    OK Interval 158    QRS Duration 84        QTc 433    P Axis 72    R AXIS 37    T Axis 47    Interpretation ECG      Sinus rhythm  Biatrial enlargement  Abnormal ECG  When compared with ECG of 25-Jan-2024 03:07,  No significant change was found  Read at 1326 by Dr. Hsu              ED Course      Medications Administered   Medications   dextrose 50 % injection 25-50 mL (has no administration in time range)   insulin regular (MYXREDLIN) 1 unit/mL infusion (5.5 Units/hr Intravenous $New Bag 1/22/25 0697)   sodium  chloride 0.9% BOLUS 2,000 mL (2,000 mLs Intravenous $New Bag 1/22/25 1321)       Procedures   Procedures     Discussion of Management   Dr. Keller the admitting hospitalist who agrees with the plan of insulin drip    ED Course   ED Course as of 01/22/25 1502   Wed Jan 22, 2025   1153 I obtained history and examined the patient as noted above.            Medical Decision Making / Diagnosis         BEBE   Tayo Ramos is a 49 year old male who has a history of type 2 diabetes but is recently been on a 10-day course of prednisone due to chest tightness and talking to his wife reports that he has taken multiple courses of steroids for URIs.  The patient was sent into the ER because of hyperglycemia was quite high here above 700 while he is ketotic I felt that is likely secondary to dehydration he was not acidotic.  I do not think this is hyperosmolar nonketotic coma likely steroid-induced hyperglycemia.  He was aggressively hydrated I did start insulin on him due to the ketosis I do not think there is likely other infection or process.  The patient is not acidotic therefore this does not meet criteria for DKA with a ketosis hyperosmotic nonketotic coma is unlikely.  It is likely steroid-induced hyperglycemia he will require correction of his volume deficit correction of his ketosis with an insulin drip and the patient was made to  [facility] [place].    Disposition   The patient was admitted to the hospital.     Critical care time 30 minutes in exclusion of any procedures    Diagnosis     ICD-10-CM    1. Steroid-induced hyperglycemia  R73.9     T38.0X5A       2. Ketosis (H)  E88.89       3. Type 2 diabetes mellitus with hyperglycemia, unspecified whether long term insulin use (H)  E11.65            Discharge Medications   New Prescriptions    No medications on file         Scribe Disclosure:  Sue CASEY, am serving as a scribe at 12:00 PM on 1/22/2025 to document services personally performed by Ashwin Hsu MD  based on my observations and the provider's statements to me.        Ashwin Hsu MD  01/22/25 7093

## 2025-01-22 NOTE — ED NOTES
[facility]  ED Nurse Handoff Report    ED Chief complaint: Eye Problem and Hyperglycemia  . ED Diagnosis:   Final diagnoses:   Steroid-induced hyperglycemia   Ketosis (H)   Type 2 diabetes mellitus with hyperglycemia, unspecified whether long term insulin use (H)       Allergies:   Allergies   Allergen Reactions    Eggs      vomits    Influenza Vaccines Other (See Comments)     PN: reacts to injection, due to egg allergy..       Code Status: Full Code    Activity level - Baseline/Home:  independent.  Activity Level - Current:   in bed and independent.   Lift room needed: No.   Bariatric: No   Needed: No   Isolation: No.   Infection: Not Applicable.     Respiratory status: Room air    Vital Signs (within 30 minutes):   Vitals:    01/22/25 1430 01/22/25 1445 01/22/25 1500 01/22/25 1515   BP: 127/83 132/74 (!) 118/94 126/89   Pulse: 77 88 84 84   Resp: 13 14 19 16   Temp:       TempSrc:       SpO2:  100% 100% 100%   Weight:           Cardiac Rhythm:  ,      Pain level:  0/10  Patient confused: No.   Patient Falls Risk: nonskid shoes/slippers when out of bed.   Elimination Status: Has voided     Patient Report - Initial Complaint: Hyperglycemia.   Focused Assessment: endocrine,      Abnormal Results:   Labs Ordered and Resulted from Time of ED Arrival to Time of ED Departure   GLUCOSE BY METER - Abnormal       Result Value    GLUCOSE BY METER POCT >600 (*)    COMPREHENSIVE METABOLIC PANEL - Abnormal    Sodium 132 (*)     Potassium 4.8      Carbon Dioxide (CO2) 23      Anion Gap 19 (*)     Urea Nitrogen 20.0      Creatinine 1.14      GFR Estimate 78      Calcium 10.0      Chloride 90 (*)     Glucose 779 (*)     Alkaline Phosphatase 167 (*)     AST 23      ALT 30      Protein Total 7.7      Albumin 4.3      Bilirubin Total 0.7     ROUTINE UA WITH MICROSCOPIC REFLEX TO CULTURE - Abnormal    Color Urine Straw      Appearance Urine Clear      Glucose Urine >=1000 (*)     Bilirubin Urine  Negative      Ketones Urine 40 (*)     Specific Gravity Urine 1.034      Blood Urine Negative      pH Urine 5.0      Protein Albumin Urine Negative      Urobilinogen Urine Normal      Nitrite Urine Negative      Leukocyte Esterase Urine Negative      Mucus Urine Present (*)     RBC Urine 1      WBC Urine 0     KETONE BETA-HYDROXYBUTYRATE QUANTITATIVE, RAPID - Abnormal    Ketone (Beta-Hydroxybutyrate) Quantitative 2.47 (*)    ISTAT GASES LACTATE VENOUS POCT - Abnormal    Lactic Acid POCT 1.3      Bicarbonate Venous POCT 26      O2 Sat, Venous POCT 91 (*)     pCO2 Venous POCT 44      pH Venous POCT 7.39      pO2 Venous POCT 62 (*)    HEMOGLOBIN A1C - Abnormal    Estimated Average Glucose 324 (*)     Hemoglobin A1C 12.9 (*)    PHOSPHORUS - Abnormal    Phosphorus 4.7 (*)    MAGNESIUM - Abnormal    Magnesium 2.6 (*)    GLUCOSE BY METER - Abnormal    GLUCOSE BY METER POCT 535 (*)    GLUCOSE BY METER - Abnormal    GLUCOSE BY METER POCT 440 (*)    TROPONIN T, HIGH SENSITIVITY - Normal    Troponin T, High Sensitivity <6     CBC WITH PLATELETS AND DIFFERENTIAL    WBC Count 7.3      RBC Count 5.29      Hemoglobin 14.6      Hematocrit 43.2      MCV 82      MCH 27.6      MCHC 33.8      RDW 12.2      Platelet Count 222      % Neutrophils 60      % Lymphocytes 30      % Monocytes 7      % Eosinophils 2      % Basophils 1      % Immature Granulocytes 0      NRBCs per 100 WBC 0      Absolute Neutrophils 4.4      Absolute Lymphocytes 2.2      Absolute Monocytes 0.5      Absolute Eosinophils 0.2      Absolute Basophils 0.0      Absolute Immature Granulocytes 0.0      Absolute NRBCs 0.0     GLUCOSE MONITOR NURSING POCT   GLUCOSE MONITOR NURSING POCT   GLUCOSE MONITOR NURSING POCT        No orders to display       Treatments provided: IVF, insulin gtt  Family Comments: Damaris (wife)  OBS brochure/video discussed/provided to patient:  N/A  ED Medications:   Medications   dextrose 50 % injection 25-50 mL (has no administration in time  range)   insulin regular (MYXREDLIN) 1 unit/mL infusion (5.5 Units/hr Intravenous Rate/Dose Verify 1/22/25 1515)   sodium chloride 0.9% BOLUS 2,000 mL (2,000 mLs Intravenous $New Bag 1/22/25 1327)       Drips infusing:  Yes - insulin  For the majority of the shift this patient was Green.   Interventions performed were N/a.    Sepsis treatment initiated: No    Cares/treatment/interventions/medications to be completed following ED care: Insulin gtt, monitor potassium    ED Nurse Name: Radha Trujillo RN  3:23 PM  RECEIVING UNIT ED HANDOFF REVIEW    Above ED Nurse Handoff Report was reviewed: Yes  Reviewed by: Azalia Johns RN on January 22, 2025 at 7:16 PM   JACINTO Grajeda called the ED to inform them the note was read: Yes-attempted to call. RN unavailable

## 2025-01-22 NOTE — H&P
[facility]    History and Physical - Hospitalist Service       Date of Admission:  1/22/2025    Assessment & Plan   Tayo Ramos is a 49 year old male admitted on 1/22/2025. He has a history of asthma for which she uses MDIs as needed.  Recently he was placed symptomatic treatment with prednisone 40 mg daily that he has taken for 10 days for asthma exacerbation.  He denies to have any formal diagnosis of diabetes or following any treatment, he was told to be prediabetic and has been losing weight and dieting.  Presented to the emergency department complaining of blurred vision, dry mouth, polyuria, thirthy and feeling weak.  He has been found with blood sugar of 700 and A1c > 12.9.    New diagnosis of diabetes mellitus with severe hyperglycemia, dehydration and anion gap metabolic acidosis.  No evidence of sepsis or any other contributor except for recent use of prednisone 40 mg daily for 10 days.  Otherwise, presence of A1c 12.9 is a solid evidence of hyperglycemia going on for a long time now.  Patient had a partial idea of his duration and considered himself like prediabetic.  Reality is contradicting this assumption.  On presentation to the emergency department he was found with severe dehydration, hyper glycemia, elevated ketones (lactic acid was normal history) but likely elevated lactic acid as well.  Patient was complaining of blurry vision.        Admit to inpatient.        IMC as long as he needs insulin drip.        Continue drip per protocol.        Hydrate for volume correction and electrolyte balance        Start Lantus tonight 22 units at bedtime        Moderate sliding scale for corrections.        Prandial insulin 1 unit / 15 g of carbohydrate before meals        Obtain lipid profile.        Patient will need extensive diabetes education and training on how to use insulin given the current A1c.        I am not starting metformin at this point, this drug will be into  consideration tomorrow.    2.  History of asthma.  Historically treated with albuterol and Advair.  Despite of recent asthma exacerbation patient is not having any symptoms of asthma on admission.    3.  Reports to be smoker.       I will offer nicotine patch        Diet: NPO for Medical/Clinical Reasons Except for: No Exceptions    DVT Prophylaxis: Enoxaparin (Lovenox) SQ  Zimmerman Catheter: Not present  Lines: None     Cardiac Monitoring: None  Code Status:  FULL CODE     Clinically Significant Risk Factors Present on Admission         # Hyponatremia: Lowest Na = 132 mmol/L in last 2 days, will monitor as appropriate  # Hypochloremia: Lowest Cl = 90 mmol/L in last 2 days, will monitor as appropriate     # Anion Gap Metabolic Acidosis: Highest Anion Gap = 19 mmol/L in last 2 days, will monitor and treat as appropriate               # DMII: A1C = 12.9 % (Ref range: <5.7 %) within past 6 months               Disposition Plan     Medically Ready for Discharge: Anticipated in 2-4 Days       Flex Keller MD  Hospitalist Service  [facility]  Securely message with [employer] (more info)  Text page via [facility] Paging/Directory     ______________________________________________________________________    Chief Complaint   Vision and high blood sugar     History is obtained from the patient, electronic health record, and emergency department physician    History of Present Illness   Tayo Ramos is a 49 year old male who has a history of asthma for which she uses MDIs as needed.  Recently he was placed symptomatic treatment with prednisone 40 mg daily that he has taken for 10 days for asthma exacerbation.  He denies to have any formal diagnosis of diabetes or following any treatment, he was told to be prediabetic and has been losing weight and dieting.  Presented to the emergency department complaining of blurred vision, dry mouth, polyuria, thirthy and feeling weak.  He has been found with blood sugar of  700 and A1c > 12.9.       Past Medical History    No past medical history on file.    Past Surgical History   No past surgical history on file.    Prior to Admission Medications   Prior to Admission Medications   Prescriptions Last Dose Informant Patient Reported? Taking?   ALBUTEROL IN   Yes No   Sig: Inhale  into the lungs daily as needed.   fluticasone-salmeterol (ADVAIR DISKUS) 100-50 MCG/DOSE diskus inhaler   Yes No   Sig: Inhale 1 puff into the lungs every 12 hours.   predniSONE (DELTASONE) 20 MG tablet   No No   Sig: Take two tablets (= 40mg) each day for 5 (five) days      Facility-Administered Medications: None        Review of Systems    The 10 point Review of Systems is negative other than noted in the HPI or here.       Physical Exam   Vital Signs: Temp: 98.6  F (37  C) Temp src: Temporal BP: 132/74 Pulse: 88   Resp: 14 SpO2: 100 % O2 Device: None (Room air)    Weight: 217 lbs 5.98 oz    GEN:  Alert, oriented x 3, appears comfortable, NAD.  HEENT:  Normocephalic/atraumatic, no scleral icterus, no nasal discharge, mouth moist.  CV:  Regular rate and rhythm, no murmur or JVD.  S1 + S2 noted, no S3 or S4.  LUNGS:  Clear to auscultation bilaterally without rales/rhonchi/wheezing/retractions.  Symmetric chest rise on inhalation noted.  ABD:  Active bowel sounds, soft, non-tender/non-distended.  No rebound/guarding/rigidity.  EXT:  No edema or cyanosis.  No joint synovitis noted.  SKIN:  Dry to touch, no exanthems noted in the visualized areas.         Medical Decision Making       70 MINUTES SPENT BY ME on the date of service doing chart review, history, exam, documentation & further activities per the note.      Data     I have personally reviewed the following data over the past 24 hrs:    7.3  \   14.6   / 222     132 (L) 90 (L) 20.0 /  535 (HH)   4.8 23 1.14 \     ALT: 30 AST: 23 AP: 167 (H) TBILI: 0.7   ALB: 4.3 TOT PROTEIN: 7.7 LIPASE: N/A     Trop: <6 BNP: N/A     TSH: N/A T4: N/A A1C: 12.9 (H)      Procal: N/A CRP: N/A Lactic Acid: 1.3         Imaging results reviewed over the past 24 hrs:   No results found for this or any previous visit (from the past 24 hours).

## 2025-01-23 ENCOUNTER — TELEPHONE (OUTPATIENT)
Facility: CLINIC | Age: 50
End: 2025-01-23
Payer: COMMERCIAL

## 2025-01-23 VITALS
OXYGEN SATURATION: 100 % | TEMPERATURE: 98.3 F | WEIGHT: 213.7 LBS | SYSTOLIC BLOOD PRESSURE: 123 MMHG | DIASTOLIC BLOOD PRESSURE: 71 MMHG | HEIGHT: 71 IN | RESPIRATION RATE: 16 BRPM | HEART RATE: 78 BPM | BODY MASS INDEX: 29.92 KG/M2

## 2025-01-23 PROBLEM — E11.9 INSULIN DEPENDENT TYPE 2 DIABETES MELLITUS (H): Status: ACTIVE | Noted: 2025-01-23

## 2025-01-23 PROBLEM — Z79.4 INSULIN DEPENDENT TYPE 2 DIABETES MELLITUS (H): Status: ACTIVE | Noted: 2025-01-23

## 2025-01-23 PROBLEM — E11.9 INSULIN-REQUIRING OR DEPENDENT TYPE II DIABETES MELLITUS (H): Status: ACTIVE | Noted: 2025-01-23

## 2025-01-23 PROBLEM — Z79.4 INSULIN-REQUIRING OR DEPENDENT TYPE II DIABETES MELLITUS (H): Status: ACTIVE | Noted: 2025-01-23

## 2025-01-23 LAB
CHOLEST SERPL-MCNC: 245 MG/DL
GLUCOSE BLDC GLUCOMTR-MCNC: 116 MG/DL (ref 70–99)
GLUCOSE BLDC GLUCOMTR-MCNC: 124 MG/DL (ref 70–99)
GLUCOSE BLDC GLUCOMTR-MCNC: 127 MG/DL (ref 70–99)
GLUCOSE BLDC GLUCOMTR-MCNC: 138 MG/DL (ref 70–99)
GLUCOSE BLDC GLUCOMTR-MCNC: 139 MG/DL (ref 70–99)
GLUCOSE BLDC GLUCOMTR-MCNC: 146 MG/DL (ref 70–99)
GLUCOSE BLDC GLUCOMTR-MCNC: 156 MG/DL (ref 70–99)
GLUCOSE BLDC GLUCOMTR-MCNC: 190 MG/DL (ref 70–99)
GLUCOSE BLDC GLUCOMTR-MCNC: 298 MG/DL (ref 70–99)
GLUCOSE BLDC GLUCOMTR-MCNC: 321 MG/DL (ref 70–99)
GLUCOSE BLDC GLUCOMTR-MCNC: 456 MG/DL (ref 70–99)
HDLC SERPL-MCNC: 38 MG/DL
HOLD SPECIMEN: NORMAL
LDLC SERPL CALC-MCNC: 160 MG/DL
MAGNESIUM SERPL-MCNC: 2.1 MG/DL (ref 1.7–2.3)
NONHDLC SERPL-MCNC: 207 MG/DL
PHOSPHATE SERPL-MCNC: 3.3 MG/DL (ref 2.5–4.5)
POTASSIUM SERPL-SCNC: 3.2 MMOL/L (ref 3.4–5.3)
POTASSIUM SERPL-SCNC: 4.5 MMOL/L (ref 3.4–5.3)
TRIGL SERPL-MCNC: 235 MG/DL

## 2025-01-23 PROCEDURE — 82465 ASSAY BLD/SERUM CHOLESTEROL: CPT | Performed by: HOSPITALIST

## 2025-01-23 PROCEDURE — 99233 SBSQ HOSP IP/OBS HIGH 50: CPT | Performed by: HOSPITALIST

## 2025-01-23 PROCEDURE — 250N000013 HC RX MED GY IP 250 OP 250 PS 637: Performed by: HOSPITALIST

## 2025-01-23 PROCEDURE — 82962 GLUCOSE BLOOD TEST: CPT

## 2025-01-23 PROCEDURE — 84100 ASSAY OF PHOSPHORUS: CPT | Performed by: HOSPITALIST

## 2025-01-23 PROCEDURE — 84132 ASSAY OF SERUM POTASSIUM: CPT | Performed by: HOSPITALIST

## 2025-01-23 PROCEDURE — G0378 HOSPITAL OBSERVATION PER HR: HCPCS

## 2025-01-23 PROCEDURE — 120N000001 HC R&B MED SURG/OB

## 2025-01-23 PROCEDURE — 36415 COLL VENOUS BLD VENIPUNCTURE: CPT | Performed by: HOSPITALIST

## 2025-01-23 PROCEDURE — 250N000009 HC RX 250: Performed by: EMERGENCY MEDICINE

## 2025-01-23 PROCEDURE — 250N000012 HC RX MED GY IP 250 OP 636 PS 637: Performed by: HOSPITALIST

## 2025-01-23 PROCEDURE — 83735 ASSAY OF MAGNESIUM: CPT | Performed by: HOSPITALIST

## 2025-01-23 PROCEDURE — 258N000003 HC RX IP 258 OP 636: Performed by: EMERGENCY MEDICINE

## 2025-01-23 RX ORDER — IBUPROFEN 400 MG/1
400 TABLET, FILM COATED ORAL EVERY 6 HOURS PRN
Status: DISCONTINUED | OUTPATIENT
Start: 2025-01-23 | End: 2025-01-24 | Stop reason: HOSPADM

## 2025-01-23 RX ORDER — ACETAMINOPHEN 650 MG/1
650 SUPPOSITORY RECTAL EVERY 4 HOURS PRN
Status: DISCONTINUED | OUTPATIENT
Start: 2025-01-23 | End: 2025-01-24 | Stop reason: HOSPADM

## 2025-01-23 RX ORDER — FLASH GLUCOSE SCANNING READER
EACH MISCELLANEOUS
Qty: 1 EACH | Refills: 0 | Status: SHIPPED | OUTPATIENT
Start: 2025-01-23

## 2025-01-23 RX ORDER — FLASH GLUCOSE SENSOR
KIT MISCELLANEOUS
Qty: 2 EACH | Refills: 5 | Status: SHIPPED | OUTPATIENT
Start: 2025-01-23

## 2025-01-23 RX ORDER — POTASSIUM CHLORIDE 1500 MG/1
40 TABLET, EXTENDED RELEASE ORAL ONCE
Status: COMPLETED | OUTPATIENT
Start: 2025-01-23 | End: 2025-01-23

## 2025-01-23 RX ORDER — ACETAMINOPHEN 325 MG/1
650 TABLET ORAL EVERY 4 HOURS PRN
Status: DISCONTINUED | OUTPATIENT
Start: 2025-01-23 | End: 2025-01-24 | Stop reason: HOSPADM

## 2025-01-23 RX ORDER — MAGNESIUM HYDROXIDE/ALUMINUM HYDROXICE/SIMETHICONE 120; 1200; 1200 MG/30ML; MG/30ML; MG/30ML
30 SUSPENSION ORAL EVERY 4 HOURS PRN
Status: DISCONTINUED | OUTPATIENT
Start: 2025-01-23 | End: 2025-01-24 | Stop reason: HOSPADM

## 2025-01-23 RX ADMIN — INSULIN ASPART 4 UNITS: 100 INJECTION, SOLUTION INTRAVENOUS; SUBCUTANEOUS at 16:17

## 2025-01-23 RX ADMIN — METFORMIN HYDROCHLORIDE 500 MG: 500 TABLET ORAL at 09:30

## 2025-01-23 RX ADMIN — ACETAMINOPHEN 650 MG: 325 TABLET, FILM COATED ORAL at 11:13

## 2025-01-23 RX ADMIN — METFORMIN HYDROCHLORIDE 500 MG: 500 TABLET ORAL at 18:26

## 2025-01-23 RX ADMIN — IBUPROFEN 400 MG: 400 TABLET, FILM COATED ORAL at 10:12

## 2025-01-23 RX ADMIN — DEXTROSE AND SODIUM CHLORIDE 1000 ML: 5; 450 INJECTION, SOLUTION INTRAVENOUS at 04:32

## 2025-01-23 RX ADMIN — ACETAMINOPHEN 650 MG: 325 TABLET, FILM COATED ORAL at 03:21

## 2025-01-23 RX ADMIN — POTASSIUM CHLORIDE 40 MEQ: 1500 TABLET, EXTENDED RELEASE ORAL at 09:30

## 2025-01-23 RX ADMIN — INSULIN GLARGINE 22 UNITS: 100 INJECTION, SOLUTION SUBCUTANEOUS at 21:29

## 2025-01-23 ASSESSMENT — ACTIVITIES OF DAILY LIVING (ADL)
ADLS_ACUITY_SCORE: 16
ADLS_ACUITY_SCORE: 23
ADLS_ACUITY_SCORE: 16
ADLS_ACUITY_SCORE: 23
ADLS_ACUITY_SCORE: 15
ADLS_ACUITY_SCORE: 15
ADLS_ACUITY_SCORE: 23
ADLS_ACUITY_SCORE: 16
ADLS_ACUITY_SCORE: 15
ADLS_ACUITY_SCORE: 15
ADLS_ACUITY_SCORE: 23
ADLS_ACUITY_SCORE: 16
ADLS_ACUITY_SCORE: 23

## 2025-01-23 NOTE — CONSULTS
"CLINICAL NUTRITION SERVICES - ASSESSMENT NOTE    RECOMMENDATIONS FOR MDs/PROVIDERS TO ORDER:  Referral to outpatient dietitian if patient is agreeable     Malnutrition Status:    Patient does not meet two of the established criteria necessary for diagnosing malnutrition    Registered Dietitian Interventions:  Provided nutrition education for eating with diabetes and provided handouts for patient to take home.        REASON FOR ASSESSMENT  Positive admission nutrition risk screen - poor appetite and weight loss.     SUBJECTIVE INFORMATION  Assessed patient in room.    PMH: asthma    Patient admitted for evaluation of an eye problem and hyperglycemia.     NUTRITION HISTORY  Per MD note: The patient reports 4 days of dry mouth, difficulty swallowing and eating, dizziness, trouble ambulating, and fatigue. Tayo has also had blurry vision for the past 4 days as well. He has been drinking a lot of water.     Patient reports that he was diagnosed with prediabetes and was doing great at home by managing his diet and daily exercise. He admits that he has 2 young children so it can be hard for him to eat well balanced meals TID. He is understandably frustrated about how the steroids have affected his blood sugars.       CURRENT NUTRITION ORDERS  Diet: Moderate Consistent Carbohydrate    CURRENT INTAKE/TOLERANCE  No intakes are noted during admission.     RD reviewed diabetes diet guidelines with patient today. Reviewed carbohydrate sources and portions, balancing meals with protein, healthy fats and non-starchy vegetables, eating 3 balanced meals daily, and discussed symptoms of hypo/hyperglycemia. Patient demonstrated a good understanding about CHO sources and how to balance meals. He is worried about using insulin, he says \"I'm not a drug addict, I am not going to do that\". RD explained to patient the purpose of insulin. Highly encouraged him to monitor his blood sugars and dose his insulin appropriately based on his " "discharge recommendations. Encouraged him to continue with healthy habits at home while also being compliant with his medications to help him manage his blood sugars.      LABS  Nutrition-relevant labs: Reviewed    MEDICATIONS  Nutrition-relevant medications:  insulin, metformin, potassium chloride    SYSTEM FINDINGS    Skin/wounds: no edema or PI noted    I/Os: no BM noted      ANTHROPOMETRICS  Height: 180.3 cm (5' 11\")  Most Recent Weight: 96.9 kg (213 lb 11.2 oz)  IBW: 166 lbs  BMI (kg/m ): Overweight BMI 25-29.9  Weight History: Patient reports intentional weight loss. Difficult to assess weight loss based on acute illness with fluid losses.   Wt Readings from Last 20 Encounters:   01/22/25 96.9 kg (213 lb 11.2 oz)   07/27/21 105.7 kg (233 lb)   02/24/12 98.2 kg (216 lb 9.6 oz)      CE  103 kg (227 lb) 11/27/2024 9:49 AM CST    105 kg (231 lb 8 oz) 08/22/2024 3:40 PM CDT  106.5 kg (234 lb 14.4 oz) 04/30/2024 10:56 AM CDT    ASSESSED NUTRITION NEEDS  Dosing Weight: 96.9 kg, based on actual wt  Estimated Energy Needs: 20 - 25 kcals/kg for weight loss and 25 - 30 kcals/kg for weight maintenance   Justification: Maintenance  Estimated Protein Needs: 1-1.2 grams of pro/kg  Justification: Maintenance  Estimated Fluid Needs: 1 mL/kcal  Justification: Maintenance      MALNUTRITION  % Intake: Decreased intake does not meet criteria  % Weight Loss: Unable to assess fluid losses vs true weight loss; pt also reported to be intentionally losing weight   Subcutaneous Fat Loss: None observed  Muscle Loss: None observed  Fluid Accumulation/Edema: None noted  Malnutrition Diagnosis: Patient does not meet two of the established criteria necessary for diagnosing malnutrition  Malnutrition Present on Admission: No    NUTRITION DIAGNOSIS  Altered nutrition related laboratory values related to new diabetes diagnosis as evidenced by need for diabetes education.     INTERVENTIONS  Collaboration by nutrition professional with other " "providers  Nutrition counseling strategies  Nutrition education application  Nutrition education content    Goals  Follow diabetes diet guidelines  Blood glucose monitoring     Monitoring/Evaluation  Progress toward goals will be monitored and evaluated per policy.      Yola Perez MS, RD, LD  Clinical Dietitian II  [facility] Message Group: \"Dietitian [Anika]\"  Office Phone: [phone]  Pagers: 3rd floor/ICU: [phone]  All other floors: [phone]  Weekend/holiday: [phone]    "

## 2025-01-23 NOTE — PROGRESS NOTES
[facility]    Medicine Progress Note - Hospitalist Service    Date of Admission:  1/22/2025    Assessment & Plan   Tayo Ramos is a 49 year old male admitted on 1/22/2025. He has a history of asthma for which she uses MDIs as needed.  Recently he was placed symptomatic treatment with prednisone 40 mg daily that he has taken for 10 days for asthma exacerbation.  He denies to have any formal diagnosis of diabetes or following any treatment, he was told to be prediabetic and has been losing weight and dieting.  Presented to the emergency department complaining of blurred vision, dry mouth, polyuria, thirthy and feeling weak.  He has been found with blood sugar of 700 and A1c > 12.9.    New diagnosis of IDDM type II with severe hyperglycemia, dehydration and anion gap metabolic acidosis.  No evidence of sepsis or any other contributor except for recent use of prednisone 40 mg daily for 10 days.  Otherwise, presence of A1c 12.9 is a solid evidence of hyperglycemia going on for a long time now.  Patient had a partial idea of his situation and considered himself like prediabetic. On presentation to the emergency department he was found with severe dehydration, hyper glycemia, elevated ketones (lactic acid was not check) but likely elevated lactic acid as well.  Patient was complaining of blurry vision.        Obervation by UR request        Discontinue IMC.        Discontinue insuline drip.        Titrate IVF to discontinue and saline lock        Lantus 22 units at bedtime        Moderate sliding scale for corrections.        Prandial insulin 1 unit / 15 g of carbohydrate before meals        Lipid profile in process.        Initiate diabetes education and training on how to use insulin given the current A1c.        Starting metformin at this point.    2.  History of asthma.  Historically treated with albuterol and Advair.  Despite of recent asthma exacerbation patient is not having any symptoms of  "asthma on admission.    3.  Reports to be smoker.       Declined nicotine patch    4. Headache.  Ibuprofen prn        Diet: Moderate Consistent Carb (60 g CHO per Meal) Diet    DVT Prophylaxis: Pneumatic Compression Devices  Zimmerman Catheter: Not present  Lines: None     Cardiac Monitoring: None  Code Status: Full Code      Clinically Significant Risk Factors Present on Admission        # Hypokalemia: Lowest K = 3.2 mmol/L in last 2 days, will replace as needed  # Hyperkalemia: Highest K = 6.5 mmol/L in last 2 days, will monitor as appropriate  # Hyponatremia: Lowest Na = 132 mmol/L in last 2 days, will monitor as appropriate  # Hypochloremia: Lowest Cl = 90 mmol/L in last 2 days, will monitor as appropriate     # Anion Gap Metabolic Acidosis: Highest Anion Gap = 19 mmol/L in last 2 days, will monitor and treat as appropriate               # DMII: A1C = 12.9 % (Ref range: <5.7 %) within past 6 months    # Overweight: Estimated body mass index is 29.81 kg/m  as calculated from the following:    Height as of this encounter: 1.803 m (5' 11\").    Weight as of this encounter: 96.9 kg (213 lb 11.2 oz).              Social Drivers of Health    Tobacco Use: High Risk (1/22/2025)    Received from [facility]    Patient History     Smoking Tobacco Use: Some Days     Smokeless Tobacco Use: Never          Disposition Plan     Medically Ready for Discharge: Anticipated Tomorrow         Flex Keller MD  Hospitalist Service  [facility]  Securely message with [employer] (more info)  Text page via [employer] Paging/Directory   ______________________________________________________________________    Interval History   Better, no dry mouth, making urine and BSG at goal. Will start CGM and train him on insulin use.     Physical Exam   Vital Signs: Temp: 97.7  F (36.5  C) Temp src: Oral BP: 112/66 Pulse: 65   Resp: 18 SpO2: 98 % O2 Device: None (Room air)    Weight: 213 lbs 11.2 " oz    GEN:  Alert, oriented x 3, appears comfortable, NAD.  HEENT:  Normocephalic/atraumatic, no scleral icterus, no nasal discharge, mouth moist.  CV:  Regular rate and rhythm, no murmur or JVD.  S1 + S2 noted, no S3 or S4.  LUNGS:  Clear to auscultation bilaterally without rales/rhonchi/wheezing/retractions.  Symmetric chest rise on inhalation noted.  ABD:  Active bowel sounds, soft, non-tender/non-distended.  No rebound/guarding/rigidity.  EXT:  No edema or cyanosis.  No joint synovitis noted.  SKIN:  Dry to touch, no exanthems noted in the visualized areas.         Medical Decision Making       50 MINUTES SPENT BY ME on the date of service doing chart review, history, exam, documentation & further activities per the note.      Data     I have personally reviewed the following data over the past 24 hrs:    7.3  \   14.6   / 222     141 106 16.9 /  139 (H)   3.2 (L) 23 0.93 \     ALT: 30 AST: 23 AP: 167 (H) TBILI: 0.7   ALB: 4.3 TOT PROTEIN: 7.7 LIPASE: N/A     Trop: <6 BNP: N/A     TSH: N/A T4: N/A A1C: 12.9 (H)     Procal: N/A CRP: N/A Lactic Acid: 1.3         Imaging results reviewed over the past 24 hrs:   No results found for this or any previous visit (from the past 24 hours).

## 2025-01-23 NOTE — PLAN OF CARE
"VS stable. Complained of headache and tylenol given. Blood glucose controlled on insulin drip.    Goal Outcome Evaluation:      Plan of Care Reviewed With: patient    Overall Patient Progress: improvingOverall Patient Progress: improving    Outcome Evaluation: Blood glucose controlled on insulin drip.      Problem: Adult Inpatient Plan of Care  Goal: Plan of Care Review  Description: The Plan of Care Review/Shift note should be completed every shift.  The Outcome Evaluation is a brief statement about your assessment that the patient is improving, declining, or no change.  This information will be displayed automatically on your shift  note.  Outcome: Progressing  Flowsheets (Taken 1/23/2025 0444)  Outcome Evaluation: Blood glucose controlled on insulin drip.  Plan of Care Reviewed With: patient  Overall Patient Progress: improving  Goal: Patient-Specific Goal (Individualized)  Description: You can add care plan individualizations to a care plan. Examples of Individualization might be:  \"Parent requests to be called daily at 9am for status\", \"I have a hard time hearing out of my right ear\", or \"Do not touch me to wake me up as it startles  me\".  Outcome: Progressing  Goal: Absence of Hospital-Acquired Illness or Injury  Outcome: Progressing  Intervention: Identify and Manage Fall Risk  Recent Flowsheet Documentation  Taken 1/23/2025 0440 by Lea Stout RN  Safety Promotion/Fall Prevention:   clutter free environment maintained   lighting adjusted   nonskid shoes/slippers when out of bed   room near nurse's station  Taken 1/23/2025 0000 by Lea Stout RN  Safety Promotion/Fall Prevention:   clutter free environment maintained   lighting adjusted   nonskid shoes/slippers when out of bed  Intervention: Prevent Skin Injury  Recent Flowsheet Documentation  Taken 1/23/2025 0440 by Lea Stout, RN  Body Position: position changed independently  Taken 1/23/2025 0000 by Lea Stout, RN  Body Position: " position changed independently  Goal: Optimal Comfort and Wellbeing  Outcome: Progressing  Intervention: Monitor Pain and Promote Comfort  Recent Flowsheet Documentation  Taken 1/23/2025 0434 by Lae Stout, RN  Pain Management Interventions: rest  Taken 1/23/2025 0321 by Lea Stout, RN  Pain Management Interventions: medication (see MAR)  Goal: Readiness for Transition of Care  Outcome: Progressing     Problem: Diabetic Ketoacidosis  Goal: Optimal Coping  Outcome: Progressing  Goal: Fluid and Electrolyte Balance with Absence of Ketosis  Outcome: Progressing     Problem: Diabetes  Goal: Optimal Coping  Outcome: Progressing  Goal: Optimal Functional Ability  Outcome: Progressing  Intervention: Optimize Functional Ability  Recent Flowsheet Documentation  Taken 1/23/2025 0440 by Lea Stout, RN  Activity Management: activity adjusted per tolerance  Activity Assistance Provided: assistance, stand-by  Taken 1/23/2025 0000 by Lea Stout, RN  Activity Management: activity adjusted per tolerance  Activity Assistance Provided: independent  Goal: Blood Glucose Level Within Target Range  Outcome: Progressing  Goal: Minimize Risk of Hypoglycemia  Outcome: Progressing

## 2025-01-23 NOTE — PROVIDER NOTIFICATION
MD paged: pt complaining of headache. Requesting ibuprofen,can we get an order for this?    Order received for tylenol.

## 2025-01-23 NOTE — TELEPHONE ENCOUNTER
Prior Authorization Approval    Medication: DEXCOM G7 SENSOR MISC  Authorization Effective Date: 1/23/2025  Authorization Expiration Date: 1/23/2026  Approved Dose/Quantity: 3 for 30  Reference #: [id number]   Insurance Company: Other (see comments)  Expected CoPay: $    CoPay Card Available:      Financial Assistance Needed:   Which Pharmacy is filling the prescription: [facility] [place], [place] - [address] [facility], [place]  Pharmacy Notified:   Patient Notified:

## 2025-01-23 NOTE — PHARMACY-ADMISSION MEDICATION HISTORY
Pharmacist Admission Medication History    Admission medication history is complete. The information provided in this note is only as accurate as the sources available at the time of the update.    Information Source(s): Patient via in-person    Pertinent Information: on admit pt would like to use own Advair Inhlaler    Changes made to PTA medication list:  Added: Duonebs prn, Multivitamins  Deleted: Prednisone  Changed: Albuterol inhaler     Allergies reviewed with patient and updates made in EHR: no    Medication History Completed By: Kwasi Godoy RPH 1/22/2025 7:00 PM    PTA Med List   Medication Sig Last Dose/Taking    albuterol (PROAIR HFA/PROVENTIL HFA/VENTOLIN HFA) 108 (90 Base) MCG/ACT inhaler Inhale 2 puffs into the lungs every 4 hours as needed for shortness of breath, wheezing or cough. Taking As Needed    fluticasone-salmeterol (ADVAIR DISKUS) 100-50 MCG/DOSE diskus inhaler Inhale 1 puff into the lungs 2 times daily. 1/22/2025 Morning    ipratropium - albuterol 0.5 mg/2.5 mg/3 mL (DUONEB) 0.5-2.5 (3) MG/3ML neb solution Take 1 vial by nebulization every 6 hours as needed for shortness of breath, wheezing or cough. Taking As Needed    Multiple Vitamins-Minerals (MULTIVITAMIN MEN) TABS Take 1 tablet by mouth daily. 1/22/2025 Morning

## 2025-01-23 NOTE — PLAN OF CARE
"Billy JOHNSON paged: \"New admit. Just want to clarify the plan for tonight. On insulin gtt. Last . Has orders for diet, sliding scale, carb coverage, lantus, etc. Anion gap 12. Last ketone 2.47 from this morning. Did you want to keep the insulin gtt on overnight or transition off? Did you want to check labs again overnight?\"    MD replied- \"keep gtt on for now. May wean off overnight if BG improves after receiving Lantus. We'll see.\"    "

## 2025-01-23 NOTE — PLAN OF CARE
"VSS. A&Ox4. On IV D5 and insulin gtt. BG dropped from 231 to 112 throughout shift following insulin gtt protocol. Pt denies pain, N/V. Tele discontinued per MD page. Lantus given at 2240. A1C 12.9 on admission. I/O initiated per orders, hat placed in toilet. Pt insisted on removal of R PIV from the ER due to discomfort. Independent, steady.        Goal Outcome Evaluation:      Plan of Care Reviewed With: patient    Overall Patient Progress: improvingOverall Patient Progress: improving    Outcome Evaluation: On IV D5 and insulin drip. BG dropped from 231 to 112.      Problem: Adult Inpatient Plan of Care  Goal: Plan of Care Review  Description: The Plan of Care Review/Shift note should be completed every shift.  The Outcome Evaluation is a brief statement about your assessment that the patient is improving, declining, or no change.  This information will be displayed automatically on your shift  note.  Outcome: Progressing  Flowsheets (Taken 1/22/2025 2251)  Outcome Evaluation: On IV D5 and insulin drip. BG dropped from 231 to 112.  Plan of Care Reviewed With: patient  Overall Patient Progress: improving  Goal: Patient-Specific Goal (Individualized)  Description: You can add care plan individualizations to a care plan. Examples of Individualization might be:  \"Parent requests to be called daily at 9am for status\", \"I have a hard time hearing out of my right ear\", or \"Do not touch me to wake me up as it startles  me\".  Outcome: Progressing  Goal: Absence of Hospital-Acquired Illness or Injury  Outcome: Progressing  Intervention: Identify and Manage Fall Risk  Recent Flowsheet Documentation  Taken 1/22/2025 2200 by Cassi Batista, RN  Safety Promotion/Fall Prevention: safety round/check completed  Taken 1/22/2025 1920 by Cassi Batista, RN  Safety Promotion/Fall Prevention:   activity supervised   assistive device/personal items within reach   clutter free environment maintained   safety round/check " completed  Intervention: Prevent Skin Injury  Recent Flowsheet Documentation  Taken 1/22/2025 2200 by Cassi Batista, RN  Body Position: position changed independently  Goal: Optimal Comfort and Wellbeing  Outcome: Progressing  Goal: Readiness for Transition of Care  Outcome: Progressing  Intervention: Mutually Develop Transition Plan  Recent Flowsheet Documentation  Taken 1/22/2025 2200 by Cassi Batista, RN  Equipment Currently Used at Home: none     Problem: Diabetic Ketoacidosis  Goal: Optimal Coping  Outcome: Progressing  Goal: Fluid and Electrolyte Balance with Absence of Ketosis  Outcome: Progressing     Problem: Diabetes  Goal: Optimal Coping  Outcome: Progressing  Goal: Optimal Functional Ability  Outcome: Progressing  Intervention: Optimize Functional Ability  Recent Flowsheet Documentation  Taken 1/22/2025 2200 by Cassi Batista, RN  Activity Management:   activity adjusted per tolerance   activity encouraged  Activity Assistance Provided: independent  Taken 1/22/2025 2100 by Cassi Batista, RN  Activity Management:   ambulated to bathroom   back to bed  Activity Assistance Provided: independent  Goal: Blood Glucose Level Within Target Range  Outcome: Progressing  Goal: Minimize Risk of Hypoglycemia  Outcome: Progressing

## 2025-01-24 VITALS
HEIGHT: 71 IN | SYSTOLIC BLOOD PRESSURE: 126 MMHG | RESPIRATION RATE: 18 BRPM | BODY MASS INDEX: 29.92 KG/M2 | WEIGHT: 213.7 LBS | TEMPERATURE: 97.4 F | OXYGEN SATURATION: 98 % | HEART RATE: 89 BPM | DIASTOLIC BLOOD PRESSURE: 66 MMHG

## 2025-01-24 LAB
GLUCOSE BLDC GLUCOMTR-MCNC: 238 MG/DL (ref 70–99)
GLUCOSE BLDC GLUCOMTR-MCNC: 252 MG/DL (ref 70–99)
GLUCOSE BLDC GLUCOMTR-MCNC: 256 MG/DL (ref 70–99)
MAGNESIUM SERPL-MCNC: 1.9 MG/DL (ref 1.7–2.3)
PHOSPHATE SERPL-MCNC: 3.6 MG/DL (ref 2.5–4.5)
POTASSIUM SERPL-SCNC: 3.5 MMOL/L (ref 3.4–5.3)

## 2025-01-24 PROCEDURE — 84100 ASSAY OF PHOSPHORUS: CPT | Performed by: HOSPITALIST

## 2025-01-24 PROCEDURE — 250N000013 HC RX MED GY IP 250 OP 250 PS 637: Performed by: HOSPITALIST

## 2025-01-24 PROCEDURE — 99239 HOSP IP/OBS DSCHRG MGMT >30: CPT | Performed by: HOSPITALIST

## 2025-01-24 PROCEDURE — 84132 ASSAY OF SERUM POTASSIUM: CPT | Performed by: HOSPITALIST

## 2025-01-24 PROCEDURE — 83735 ASSAY OF MAGNESIUM: CPT | Performed by: HOSPITALIST

## 2025-01-24 PROCEDURE — 36415 COLL VENOUS BLD VENIPUNCTURE: CPT | Performed by: HOSPITALIST

## 2025-01-24 PROCEDURE — 82962 GLUCOSE BLOOD TEST: CPT

## 2025-01-24 RX ORDER — MAGNESIUM OXIDE 400 MG/1
400 TABLET ORAL EVERY 4 HOURS
Status: DISCONTINUED | OUTPATIENT
Start: 2025-01-24 | End: 2025-01-24 | Stop reason: HOSPADM

## 2025-01-24 RX ORDER — POTASSIUM CHLORIDE 1500 MG/1
20 TABLET, EXTENDED RELEASE ORAL ONCE
Status: COMPLETED | OUTPATIENT
Start: 2025-01-24 | End: 2025-01-24

## 2025-01-24 RX ORDER — ATORVASTATIN CALCIUM 20 MG/1
20 TABLET, FILM COATED ORAL DAILY
Qty: 30 TABLET | Refills: 0 | Status: SHIPPED | OUTPATIENT
Start: 2025-01-24 | End: 2025-02-23

## 2025-01-24 RX ADMIN — POTASSIUM CHLORIDE 20 MEQ: 1500 TABLET, EXTENDED RELEASE ORAL at 11:27

## 2025-01-24 RX ADMIN — INSULIN ASPART 3 UNITS: 100 INJECTION, SOLUTION INTRAVENOUS; SUBCUTANEOUS at 13:13

## 2025-01-24 RX ADMIN — INSULIN ASPART 3 UNITS: 100 INJECTION, SOLUTION INTRAVENOUS; SUBCUTANEOUS at 07:51

## 2025-01-24 RX ADMIN — METFORMIN HYDROCHLORIDE 500 MG: 500 TABLET ORAL at 09:21

## 2025-01-24 RX ADMIN — Medication 400 MG: at 11:27

## 2025-01-24 ASSESSMENT — ACTIVITIES OF DAILY LIVING (ADL)
ADLS_ACUITY_SCORE: 22

## 2025-01-24 NOTE — PROGRESS NOTES
Discharge instructions provided. New Diabetes education also provided.  AVS instructions covered. Discharge medication to be picked up at [facility].  IV removed. Patient belongings returned. Patient to receive transportation from spouse.

## 2025-01-24 NOTE — DISCHARGE SUMMARY
"[facility]  Hospitalist Discharge Summary      Date of Admission:  1/22/2025  Date of Discharge:  1/24/2025  Discharging Provider: Flex Keller MD  Discharge Service: Hospitalist Service    Discharge Diagnoses   New diagnosis of IDDM type II with severe hyperglycemia, dehydration and anion gap metabolic acidosis.  A1c 12.9  Hyperlipidemia     Active health problems  History of persistent asthma.   Active smoker.  History of headache      Clinically Significant Risk Factors     # DMII: A1C = 12.9 % (Ref range: <5.7 %) within past 6 months  # Overweight: Estimated body mass index is 29.81 kg/m  as calculated from the following:    Height as of this encounter: 1.803 m (5' 11\").    Weight as of this encounter: 96.9 kg (213 lb 11.2 oz).       Follow-ups Needed After Discharge   Follow-up Appointments       Follow-up and recommended labs and tests       Follow up with primary care provider, Physician No Ref-Primary, within 7 days for hospital follow- up, regarding new diagnosis, and ROUTINE diabetes control/education/medications dose adjustment..  No follow up labs or test are needed.  YOU NEED TO SEE YOU PRIMARY CARE DOCTOR FOR TREAT,MENT AND CONTROL ASAP  YOU HAVE TO FOLLOW RECOMMENDED DIET AND CONTINUE/INCREASE YOUR WEAKLY EXERCISE ROUTINE             Additional follow-up instructions/to-do's for PCP    :PCP    Unresulted Labs Ordered in the Past 30 Days of this Admission       Date and Time Order Name Status Description    1/22/2025  4:58 PM Cortisol Saliva In process         These results will be followed up by PCP    Discharge Disposition   Discharged to home  Condition at discharge: Stable    Hospital Course   Tayo Ramos is a 49 year old male admitted on 1/22/2025. He has a history of asthma for which she uses MDIs as needed.  Recently he was placed symptomatic treatment with prednisone 40 mg daily that he has taken for 10 days for asthma exacerbation.  He denies to have any formal " diagnosis of diabetes or following any treatment, he was told to be prediabetic and has been losing weight and dieting.  Presented to the emergency department complaining of blurred vision, dry mouth, polyuria, thirthy and feeling weak.  He has been found with blood sugar up to 700 and A1c > 12.9.    New diagnosis of IDDM type II with severe hyperglycemia, dehydration and anion gap metabolic acidosis.  No evidence of sepsis or any other contributor except for recent use of prednisone 40 mg daily for 10 days.  Otherwise, presence of A1c 12.9 is a solid evidence of hyperglycemia going on for a long time now.  Patient had a partial idea of his situation and considered himself like prediabetic. On presentation to the emergency department he was found with severe dehydration, hyper glycemia, elevated ketones (lactic acid was not check) but likely elevated lactic acid as well.  Patient was complaining of blurry vision.     Inpatient        Discontinue IMC.        Discontinue insuline drip.        Titrate IVF to discontinue and saline lock        Lantus 26 units at bedtime and increase as needed        Moderate sliding scale for corrections.        Prandial insulin 2 unit / 15 g of carbohydrate before meals        Lipid profile in process.        Initiate diabetes education and training on how to use insulin given the current A1c.        Starting metformin at this point.    2.  History of asthma.  Historically treated with albuterol and Advair.  Despite of recent asthma exacerbation patient is not having any symptoms of asthma on admission.    3.  Reports to be smoker.       Advised to quit. Declined nicotine patch    4. Headache.  Ibuprofen prn    Consultations This Hospital Stay   CARE MANAGEMENT / SOCIAL WORK IP CONSULT  PHARMACY DISCHARGE EDUCATION BY PHARMACIST    Code Status   Full Code    Time Spent on this Encounter   I, Flex Keller MD, personally saw the patient today and spent greater than 30 minutes  discharging this patient.       Flex Keller MD  [facility]  [address] [place] [address]  Phone: [phone]  Fax: [phone]  ______________________________________________________________________    Physical Exam   Vital Signs: Temp: 97.9  F (36.6  C) Temp src: Oral BP: 113/58 Pulse: 85   Resp: 18 SpO2: 97 % O2 Device: None (Room air)    Weight: 213 lbs 11.2 oz  Constitutional: awake, alert, cooperative, no apparent distress, and appears stated age  Respiratory: No increased work of breathing, good air exchange, clear to auscultation bilaterally, no crackles or wheezing  Cardiovascular: Normal apical impulse, regular rate and rhythm, normal S1 and S2, no S3 or S4, and no murmur noted       Primary Care Physician   Physician No Ref-Primary    Discharge Orders      Reason for your hospital stay    DIABETES MELLITUS WITH HYPERGLYCEMIA AND ANION GAP METABOLIC ACIDOSIS     Activity    Your activity upon discharge: activity as tolerated     Follow-up and recommended labs and tests     Follow up with primary care provider, Physician No Ref-Primary, within 7 days for hospital follow- up, regarding new diagnosis, and ROUTINE diabetes control/education/medications dose adjustment..  No follow up labs or test are needed.  YOU NEED TO SEE YOU PRIMARY CARE DOCTOR FOR TREAT,MENT AND CONTROL ASAP  YOU HAVE TO FOLLOW RECOMMENDED DIET AND CONTINUE/INCREASE YOUR WEAKLY EXERCISE ROUTINE     Diet    Follow this diet upon discharge:        Moderate Consistent Carb (60 g CHO per Meal) Diet       Significant Results and Procedures   Results for orders placed or performed during the hospital encounter of 01/25/24   XR Chest 2 Views    Narrative    EXAM: XR CHEST 2 VIEWS  LOCATION: [facility]  DATE: 1/25/2024    INDICATION: chest pain, SOB  COMPARISON: None.      Impression    IMPRESSION: Negative chest.       Discharge Medications   Current Discharge  Medication List        START taking these medications    Details   atorvastatin (LIPITOR) 20 MG tablet Take 1 tablet (20 mg) by mouth daily.  Qty: 30 tablet, Refills: 0    Associated Diagnoses: Hyperlipidemia LDL goal <100      Continuous Glucose  (FREESTYLE [place] 14 DAY READER) SARMAD Use to read blood sugars as per 's instructions.  Qty: 1 each, Refills: 0    Associated Diagnoses: Ketosis (H); Insulin dependent type 2 diabetes mellitus (H)      Continuous Glucose Sensor (FREESTYLE [place] 14 DAY SENSOR) MISC Change every 14 days.  Qty: 2 each, Refills: 5    Associated Diagnoses: Insulin dependent type 2 diabetes mellitus (H)      insulin aspart (NOVOLOG PEN) 100 UNIT/ML pen Inject 10 Units subcutaneously 3 times daily (with meals).  Qty: 18 mL, Refills: 0    Associated Diagnoses: Insulin-requiring or dependent type II diabetes mellitus (H)      insulin glargine (LANTUS PEN) 100 UNIT/ML pen Inject 26 Units subcutaneously at bedtime.  Qty: 15 mL, Refills: 0    Comments: If Lantus is not covered by insurance, may substitute Basaglar or Semglee or other insulin glargine product per insurance preference at same dose and frequency.    Associated Diagnoses: Insulin-requiring or dependent type II diabetes mellitus (H)      metFORMIN (GLUCOPHAGE) 500 MG tablet Take 1 tablet (500 mg) by mouth 2 times daily (with meals).  Qty: 60 tablet, Refills: 0    Associated Diagnoses: Insulin-requiring or dependent type II diabetes mellitus (H)           CONTINUE these medications which have NOT CHANGED    Details   albuterol (PROAIR HFA/PROVENTIL HFA/VENTOLIN HFA) 108 (90 Base) MCG/ACT inhaler Inhale 2 puffs into the lungs every 4 hours as needed for shortness of breath, wheezing or cough.      fluticasone-salmeterol (ADVAIR DISKUS) 100-50 MCG/DOSE diskus inhaler Inhale 1 puff into the lungs 2 times daily.      ipratropium - albuterol 0.5 mg/2.5 mg/3 mL (DUONEB) 0.5-2.5 (3) MG/3ML neb solution Take 1 vial by  nebulization every 6 hours as needed for shortness of breath, wheezing or cough.      Multiple Vitamins-Minerals (MULTIVITAMIN MEN) TABS Take 1 tablet by mouth daily.           Allergies   Allergies   Allergen Reactions    Eggs      vomits    Influenza Vaccines Other (See Comments)     PN: reacts to injection, due to egg allergy..

## 2025-01-24 NOTE — PLAN OF CARE
"/58 (BP Location: Right arm)   Pulse 78   Temp 98.7  F (37.1  C) (Oral)   Resp 18   Ht 1.803 m (5' 11\")   Wt 96.9 kg (213 lb 11.2 oz)   SpO2 100%   BMI 29.81 kg/m      VSS, C/o headache with relief from tylenol. Transitioned off insulin GTT this AM, RN installed dexcom monitor and provided education. Plan for possible discharge tomorrow.   "

## 2025-01-24 NOTE — PLAN OF CARE
"VS stable. No complaints of pain. Blood glucose of 256.    Goal Outcome Evaluation:      Plan of Care Reviewed With: patient    Overall Patient Progress: improvingOverall Patient Progress: improving    Outcome Evaluation: VS stable. No complaints of pain.      Problem: Adult Inpatient Plan of Care  Goal: Plan of Care Review  Description: The Plan of Care Review/Shift note should be completed every shift.  The Outcome Evaluation is a brief statement about your assessment that the patient is improving, declining, or no change.  This information will be displayed automatically on your shift  note.  Outcome: Progressing  Flowsheets (Taken 1/24/2025 0419)  Outcome Evaluation: VS stable. No complaints of pain.  Plan of Care Reviewed With: patient  Overall Patient Progress: improving  Goal: Patient-Specific Goal (Individualized)  Description: You can add care plan individualizations to a care plan. Examples of Individualization might be:  \"Parent requests to be called daily at 9am for status\", \"I have a hard time hearing out of my right ear\", or \"Do not touch me to wake me up as it startles  me\".  Outcome: Progressing  Goal: Absence of Hospital-Acquired Illness or Injury  Outcome: Progressing  Intervention: Identify and Manage Fall Risk  Recent Flowsheet Documentation  Taken 1/24/2025 0000 by Lea Stout RN  Safety Promotion/Fall Prevention:   clutter free environment maintained   lighting adjusted   nonskid shoes/slippers when out of bed  Intervention: Prevent Skin Injury  Recent Flowsheet Documentation  Taken 1/24/2025 0000 by Lea Stout RN  Body Position: position changed independently  Intervention: Prevent Infection  Recent Flowsheet Documentation  Taken 1/24/2025 0000 by Lea Stout RN  Infection Prevention:   rest/sleep promoted   single patient room provided  Goal: Optimal Comfort and Wellbeing  Outcome: Progressing  Goal: Readiness for Transition of Care  Outcome: Progressing     Problem: Diabetic " Ketoacidosis  Goal: Optimal Coping  Outcome: Progressing  Goal: Fluid and Electrolyte Balance with Absence of Ketosis  Outcome: Progressing  Intervention: Monitor and Manage Ketoacidosis  Recent Flowsheet Documentation  Taken 1/24/2025 0000 by Lea Stout RN  Glycemic Management: blood glucose monitored     Problem: Diabetes  Goal: Optimal Coping  Outcome: Progressing  Goal: Optimal Functional Ability  Outcome: Progressing  Intervention: Optimize Functional Ability  Recent Flowsheet Documentation  Taken 1/24/2025 0000 by Lea Stout, RN  Activity Management: activity adjusted per tolerance  Activity Assistance Provided: independent  Goal: Blood Glucose Level Within Target Range  Outcome: Progressing  Goal: Minimize Risk of Hypoglycemia  Outcome: Progressing

## 2025-01-26 LAB — CORTIS SAL-MCNC: 0.29 UG/DL
